# Patient Record
Sex: FEMALE | Race: WHITE | Employment: FULL TIME | RURAL
[De-identification: names, ages, dates, MRNs, and addresses within clinical notes are randomized per-mention and may not be internally consistent; named-entity substitution may affect disease eponyms.]

---

## 2018-08-02 ENCOUNTER — OFFICE VISIT (OUTPATIENT)
Dept: FAMILY MEDICINE CLINIC | Age: 33
End: 2018-08-02

## 2018-08-02 VITALS
HEIGHT: 67 IN | BODY MASS INDEX: 30.61 KG/M2 | DIASTOLIC BLOOD PRESSURE: 75 MMHG | RESPIRATION RATE: 18 BRPM | HEART RATE: 74 BPM | WEIGHT: 195 LBS | SYSTOLIC BLOOD PRESSURE: 130 MMHG | OXYGEN SATURATION: 98 % | TEMPERATURE: 98 F

## 2018-08-02 DIAGNOSIS — G43.909 MIGRAINE WITHOUT STATUS MIGRAINOSUS, NOT INTRACTABLE, UNSPECIFIED MIGRAINE TYPE: ICD-10-CM

## 2018-08-02 DIAGNOSIS — K76.0 FATTY LIVER: ICD-10-CM

## 2018-08-02 DIAGNOSIS — N92.1 MENORRHAGIA WITH IRREGULAR CYCLE: ICD-10-CM

## 2018-08-02 DIAGNOSIS — R10.11 RIGHT UPPER QUADRANT ABDOMINAL PAIN: Primary | ICD-10-CM

## 2018-08-02 LAB
HCG URINE, QL. (POC): NEGATIVE
VALID INTERNAL CONTROL?: YES

## 2018-08-02 RX ORDER — SUMATRIPTAN 25 MG/1
25 TABLET, FILM COATED ORAL
Qty: 12 TAB | Refills: 2 | Status: SHIPPED | OUTPATIENT
Start: 2018-08-02 | End: 2018-08-02

## 2018-08-02 RX ORDER — FAMOTIDINE 40 MG/1
40 TABLET, FILM COATED ORAL
COMMUNITY
Start: 2018-07-30 | End: 2019-10-10 | Stop reason: ALTCHOICE

## 2018-08-02 RX ORDER — NORGESTIMATE AND ETHINYL ESTRADIOL 0.25-0.035
1 KIT ORAL DAILY
Qty: 3 DOSE PACK | Refills: 1 | Status: SHIPPED | OUTPATIENT
Start: 2018-08-02 | End: 2018-09-22

## 2018-08-02 RX ORDER — ONDANSETRON 4 MG/1
4 TABLET, FILM COATED ORAL
COMMUNITY
Start: 2018-07-30 | End: 2018-08-30 | Stop reason: ALTCHOICE

## 2018-08-02 NOTE — PROGRESS NOTES
Identified pt with two pt identifiers(name and ). Chief Complaint   Patient presents with    New Patient     was seen at [facility] on 2018 d/t throwing up blood and abdominal pain - had elevated liver enzymes on labs and ultimetley was dx with fatty liver and stomach ulcer    Heavy Period     was on ernesto, however, then had spotting all the time, was advised ablation but cannot afford at this time        Health Maintenance Due   Topic    PAP AKA CERVICAL CYTOLOGY     Influenza Age 5 to Adult        Wt Readings from Last 3 Encounters:   18 195 lb (88.5 kg)   13 178 lb (80.7 kg)   10/28/13 177 lb 6 oz (80.5 kg)     Temp Readings from Last 3 Encounters:   18 98 °F (36.7 °C) (Oral)   13 98.4 °F (36.9 °C)   10/28/13 97.9 °F (36.6 °C)     BP Readings from Last 3 Encounters:   18 130/75   13 122/74   10/28/13 (!) 148/93     Pulse Readings from Last 3 Encounters:   18 74   13 65   10/28/13 92         Learning Assessment:  :     Learning Assessment 2018   PRIMARY LEARNER Patient   BARRIERS PRIMARY LEARNER NONE   CO-LEARNER CAREGIVER No   PRIMARY LANGUAGE ENGLISH   LEARNER PREFERENCE PRIMARY DEMONSTRATION     LISTENING   ANSWERED BY patient   RELATIONSHIP SELF       Depression Screening:  :     PHQ over the last two weeks 2018   Little interest or pleasure in doing things Not at all   Feeling down, depressed, irritable, or hopeless Not at all   Total Score PHQ 2 0       Fall Risk Assessment:  :     No flowsheet data found. Abuse Screening:  :     Abuse Screening Questionnaire 2018   Do you ever feel afraid of your partner? N   Are you in a relationship with someone who physically or mentally threatens you? N   Is it safe for you to go home?  Y         Coordination of Care Questionnaire:  :     1) Have you been to an emergency room, urgent care clinic since your last visit? no   Hospitalized since your last visit? no             2) Have you seen or consulted any other health care providers outside of [address] [place] [address] since your last visit? no  (Include any pap smears or colon screenings in this section.)    3) Do you have an Advance Directive on file? no  Are you interested in receiving information about Advance Directives? no    Patient is accompanied by self. Reviewed record in preparation for visit and have obtained necessary documentation. Medication reconciliation up to date and corrected with patient at this time.

## 2018-08-02 NOTE — PATIENT INSTRUCTIONS

## 2018-08-02 NOTE — PROGRESS NOTES
HISTORY OF PRESENT ILLNESS  Gia Smith is a 28 y.o. female. HPI  Pt presents as a new patient with \"heavy menses\"    Pt was seen at [facility] on 7/30, for abdominal pain and vomiting up blood. Pt was diagnosed with fatty liver and a stomach ulcer. Pt needs endoscopy for follow up, in relation to the ulcer. Abdominal pain is better with the Pepcid and the zofran has helped the nausea. Pt has yet to make an appointment with specialists. In addition, patient has been followed by [facility] for very heavy menses. Pt had an US, biopsy, and was set up for an ablation, until she found out how expensive this was. Pt declined the ablation due to cost, and was placed on ernesto. Pt states that this regulated her menses for about 4 months, and then she started spotting every day. Due to this, she stopped the Ernesto over a month ago. LMP: 7/10, and it lasted 10 days. Period is incredibly heavy. In addition, patient states that she is followed by neurology for migraines, but she has not been able to follow up due to work schedule. She was on Imitrex, as needed for migraines, and it has worked quite well. Pt is requesting refill, until she is able to be seen by neurology. Review of Systems   Constitutional: Negative for fever. HENT: Negative for congestion. Respiratory: Negative for cough. Gastrointestinal: Positive for abdominal pain and nausea. Negative for blood in stool, constipation, diarrhea and heartburn. Physical Exam   Constitutional: She is oriented to person, place, and time. She appears well-developed and well-nourished. HENT:   Head: Normocephalic and atraumatic. Cardiovascular: Normal rate, regular rhythm and normal heart sounds. Pulmonary/Chest: Effort normal and breath sounds normal. She has no wheezes. She has no rales. She exhibits no tenderness. Abdominal: Soft.  Normal appearance and bowel sounds are normal. There is tenderness in the right upper quadrant. There is no rigidity, no rebound, no guarding and no CVA tenderness. Neurological: She is alert and oriented to person, place, and time. Skin: Skin is warm and dry. Psychiatric: She has a normal mood and affect. Her behavior is normal.       ASSESSMENT and PLAN    ICD-10-CM ICD-9-CM    1. Right upper quadrant abdominal pain R10.11 789.01 REFERRAL TO GASTROENTEROLOGY   2. Fatty liver K76.0 571.8 REFERRAL TO LIVER HEPATOLOGY   3. Migraine without status migrainosus, not intractable, unspecified migraine type G43.909 346.90 SUMAtriptan (IMITREX) 25 mg tablet   4. Menorrhagia with irregular cycle N92.1 626.2 AMB POC URINE PREGNANCY TEST, VISUAL COLOR COMPARISON      norgestimate-ethinyl estradiol (ORTHO-CYCLEN, SPRINTEC) 0.25-35 mg-mcg tab     Informed patient to call GI and hepatology today, for appointment ASAP. Educated about always going to ER with worsening of abdominal pain, vomiting, fever, etc.    Will try ortho cyclen, for menses control. Educated about following up with neurology, as previously scheduled. Pt informed to return to office with worsening of symptoms, or PRN with any questions or concerns. Pt verbalizes understanding of plan of care and denies further questions or concerns at this time.

## 2018-08-02 NOTE — MR AVS SNAPSHOT
[facility] [address] [facility] 
[phone] Patient: Clemencia Hilliard MRN:  :[id number] Visit Information Date & Time Provider Department Dept. Phone Encounter #  
 2018  1:30 PM Dario Barrow Sathish [id number] Follow-up Instructions Return if symptoms worsen or fail to improve. Upcoming Health Maintenance Date Due  
 PAP AKA CERVICAL CYTOLOGY 2005 Influenza Age 5 to Adult 2018 DTaP/Tdap/Td series (2 - Td) 2023 Allergies as of 2018  Review Complete On: 2018 By: Dario Barrow NP No Known Allergies Current Immunizations  Reviewed on 10/28/2013 Name Date Tdap 2013  9:56 AM  
  
 Not reviewed this visit You Were Diagnosed With   
  
 Codes Comments Right upper quadrant abdominal pain    -  Primary ICD-10-CM: R10.11 ICD-9-CM: 789.01 Fatty liver     ICD-10-CM: K76.0 ICD-9-CM: 571.8 Migraine without status migrainosus, not intractable, unspecified migraine type     ICD-10-CM: G43.909 ICD-9-CM: 346.90 Menorrhagia with irregular cycle     ICD-10-CM: N92.1 ICD-9-CM: 626.2 Vitals BP Pulse Temp Resp Height(growth percentile) Weight(growth percentile) 130/75 (BP 1 Location: Right arm, BP Patient Position: Sitting) 74 98 °F (36.7 °C) (Oral) 18 5' 7\" (1.702 m) 195 lb (88.5 kg) LMP SpO2 Breastfeeding? BMI OB Status Smoking Status 07/10/2018 (Approximate) 98% No 30.54 kg/m2 Having regular periods Never Smoker BMI and BSA Data Body Mass Index Body Surface Area 30.54 kg/m 2 2.05 m 2 Preferred Pharmacy Pharmacy Name Phone [phone] Your Updated Medication List  
  
   
This list is accurate as of 18  2:06 PM.  Always use your most recent med list.  
  
  
  
  
 famotidine 40 mg tablet Commonly known as:  PEPCID Take 40 mg by mouth nightly. norgestimate-ethinyl estradiol 0.25-35 mg-mcg Tab Commonly known as:  Sameul Rickrosario Take 1 Tab by mouth daily. Must take at same time every day. ondansetron hcl 4 mg tablet Commonly known as:  Chelsea Scriver Take 4 mg by mouth every six (6) hours as needed. SUMAtriptan 25 mg tablet Commonly known as:  IMITREX Take 1 Tab by mouth once as needed for Migraine for up to 1 dose. May repeat dose x 1 after 2 hours. Prescriptions Sent to Pharmacy Refills SUMAtriptan (IMITREX) 25 mg tablet 2 Sig: Take 1 Tab by mouth once as needed for Migraine for up to 1 dose. May repeat dose x 1 after 2 hours. Class: Normal  
 Pharmacy: [address] [place] [address] Ph #: [phone] Route: Oral  
 norgestimate-ethinyl estradiol (ORTHO-CYCLEN, SPRINTEC) 0.25-35 mg-mcg tab 1 Sig: Take 1 Tab by mouth daily. Must take at same time every day. Class: Normal  
 Pharmacy: [address] [place] [address] Ph #: [phone] Route: Oral  
  
We Performed the Following AMB POC URINE PREGNANCY TEST, VISUAL COLOR COMPARISON [23028 CPT(R)] REFERRAL TO GASTROENTEROLOGY [TAV55 Custom] REFERRAL TO LIVER HEPATOLOGY [IAB367 Custom] Follow-up Instructions Return if symptoms worsen or fail to improve. Referral Information Referral ID Referred By Referred To  
  
 [id number] [place] [address] [address] [place] [address], [id number] [facility] Visits Status Start Date End Date 1 New Request 8/2/18 8/2/19 If your referral has a status of pending review or denied, additional information will be sent to support the outcome of this decision. Referral ID Referred By Referred To  
 [id number] Debbie PARTIDA MD  
   [address] [facility] [address] [place] [address] Phone: [phone] Fax: [phone] Visits Status Start Date End Date 1 New Request 8/2/18 8/2/19 If your referral has a status of pending review or denied, additional information will be sent to support the outcome of this decision. Patient Instructions Abdominal Pain: Care Instructions Your Care Instructions Abdominal pain has many possible causes. Some aren't serious and get better on their own in a few days. Others need more testing and treatment. If your pain continues or gets worse, you need to be rechecked and may need more tests to find out what is wrong. You may need surgery to correct the problem. Don't ignore new symptoms, such as fever, nausea and vomiting, urination problems, pain that gets worse, and dizziness. These may be signs of a more serious problem. Your doctor may have recommended a follow-up visit in the next 8 to 12 hours. If you are not getting better, you may need more tests or treatment. The doctor has checked you carefully, but problems can develop later. If you notice any problems or new symptoms, get medical treatment right away. Follow-up care is a key part of your treatment and safety. Be sure to make and go to all appointments, and call your doctor if you are having problems. It's also a good idea to know your test results and keep a list of the medicines you take. How can you care for yourself at home? · Rest until you feel better. · To prevent dehydration, drink plenty of fluids, enough so that your urine is light yellow or clear like water. Choose water and other caffeine-free clear liquids until you feel better. If you have kidney, heart, or liver disease and have to limit fluids, talk with your doctor before you increase the amount of fluids you drink. · If your stomach is upset, eat mild foods, such as rice, dry toast or crackers, bananas, and applesauce. Try eating several small meals instead of two or three large ones. · Wait until 48 hours after all symptoms have gone away before you have spicy foods, alcohol, and drinks that contain caffeine. · Do not eat foods that are high in fat. · Avoid anti-inflammatory medicines such as aspirin, ibuprofen (Advil, Motrin), and naproxen (Aleve). These can cause stomach upset. Talk to your doctor if you take daily aspirin for another health problem. When should you call for help? Call 911 anytime you think you may need emergency care. For example, call if: 
  · You passed out (lost consciousness).  
  · You pass maroon or very bloody stools.  
  · You vomit blood or what looks like coffee grounds.  
  · You have new, severe belly pain.  
 Call your doctor now or seek immediate medical care if: 
  · Your pain gets worse, especially if it becomes focused in one area of your belly.  
  · You have a new or higher fever.  
  · Your stools are black and look like tar, or they have streaks of blood.  
  · You have unexpected vaginal bleeding.  
  · You have symptoms of a urinary tract infection. These may include: 
¨ Pain when you urinate. ¨ Urinating more often than usual. 
¨ Blood in your urine.  
  · You are dizzy or lightheaded, or you feel like you may faint.  
 Watch closely for changes in your health, and be sure to contact your doctor if: 
  · You are not getting better after 1 day (24 hours). Where can you learn more? Go to http://wes-mohamud.info/. Enter I640 in the search box to learn more about \"Abdominal Pain: Care Instructions. \" Current as of: November 20, 2017 Content Version: 11.7 © 0685-3002 "[employer] Inc.". Care instructions adapted under license by [employer] (which disclaims liability or warranty for this information). If you have questions about a medical condition or this instruction, always ask your healthcare professional. [address] any warranty or liability for your use of this information. Introducing [facility] & HEALTH SERVICES! Dear Frank Randall: Thank you for requesting a Mostro account. Our records indicate that you already have an active Mostro account. You can access your account anytime at [email or web address]. [employer] Did you know that you can access your hospital and ER discharge instructions at any time in Mostro? You can also review all of your test results from your hospital stay or ER visit. Additional Information If you have questions, please visit the Frequently Asked Questions section of the Mostro website at [email or web address]/. Remember, Mostro is NOT to be used for urgent needs. For medical emergencies, dial 911. Now available from your iPhone and Android! Please provide this summary of care documentation to your next provider. Your primary care clinician is listed as Jocelynn Freire. If you have any questions after today's visit, please call [phone].

## 2018-08-30 ENCOUNTER — OFFICE VISIT (OUTPATIENT)
Dept: FAMILY MEDICINE CLINIC | Age: 33
End: 2018-08-30

## 2018-08-30 VITALS
RESPIRATION RATE: 16 BRPM | BODY MASS INDEX: 31.08 KG/M2 | SYSTOLIC BLOOD PRESSURE: 130 MMHG | OXYGEN SATURATION: 97 % | HEIGHT: 67 IN | WEIGHT: 198 LBS | DIASTOLIC BLOOD PRESSURE: 83 MMHG | HEART RATE: 95 BPM | TEMPERATURE: 98.4 F
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
## 2018-08-30 DIAGNOSIS — L03.115 CELLULITIS OF RIGHT LOWER EXTREMITY: Primary | ICD-10-CM

## 2018-08-30 RX ORDER — DIPHENHYDRAMINE HCL 25 MG
25 CAPSULE ORAL
COMMUNITY
End: 2018-09-22

## 2018-08-30 RX ORDER — PREDNISONE 5 MG/1
TABLET ORAL
Qty: 21 TAB | Refills: 0 | Status: SHIPPED | OUTPATIENT
Start: 2018-08-30 | End: 2018-09-22

## 2018-08-30 RX ORDER — CEPHALEXIN 500 MG/1
500 CAPSULE ORAL 2 TIMES DAILY
Qty: 20 CAP | Refills: 0 | Status: SHIPPED | OUTPATIENT
Start: 2018-08-30 | End: 2018-09-09

## 2018-08-30 RX ORDER — SUMATRIPTAN 25 MG/1
25 TABLET, FILM COATED ORAL AS NEEDED
COMMUNITY
End: 2020-07-15

## 2018-08-30 NOTE — LETTER
NOTIFICATION RETURN TO WORK / SCHOOL 
 
8/30/2018 10:21 AM 
 
Ms. Stephen Arias [id number] [facility]  [address] To Whom It May Concern: 
 
Stephen Arias is currently under the care of [address] [place] [address]. She needs to be excused from work on  8/30, due to illness. If there are questions or concerns please have the patient contact our office. Sincerely, Apoorva Vasques NP

## 2018-08-30 NOTE — MR AVS SNAPSHOT
[address] [facility] 
[phone] Patient: Clemencia Hilliard MRN:  :[id number] Visit Information Date & Time Provider Department Dept. Phone Encounter #  
 2018  9:45 AM Dario Barrow Sathish [phone] [id number] Follow-up Instructions Return if symptoms worsen or fail to improve. Your Appointments 2018  9:00 AM  
New Patient with Marisela Alcala MD  
[address] ([facility]) Appt Note: New Patient referred  NP Ko for elevated enzymes and fatty liver tr 8/3/18 [address] [place] [address] [phone] [facility] [address]  
[address] [facility] [place] [address] Upcoming Health Maintenance Date Due  
 PAP AKA CERVICAL CYTOLOGY 2005 Influenza Age 5 to Adult 2018 DTaP/Tdap/Td series (2 - Td) 2023 Allergies as of 2018  Review Complete On: 2018 By: Dario Barrow NP No Known Allergies Current Immunizations  Reviewed on 10/28/2013 Name Date Tdap 2013  9:56 AM  
  
 Not reviewed this visit You Were Diagnosed With   
  
 Codes Comments Cellulitis of right lower extremity    -  Primary ICD-10-CM: E71.471 ICD-9-CM: 682.6 Insect bite, initial encounter     ICD-10-CM: W57. Jimmye Rise ICD-9-CM: 919.4, E906.4 Vitals BP Pulse Temp Resp Height(growth percentile) Weight(growth percentile) 130/83 (BP 1 Location: Left arm, BP Patient Position: Sitting) 95 98.4 °F (36.9 °C) (Oral) 16 5' 7\" (1.702 m) 198 lb (89.8 kg) SpO2 BMI OB Status Smoking Status 97% 31.01 kg/m2 Having regular periods Never Smoker Vitals History BMI and BSA Data Body Mass Index Body Surface Area 31.01 kg/m 2 2.06 m 2 Preferred Pharmacy Pharmacy Name Phone [id number] [phone] Your Updated Medication List  
  
   
This list is accurate as of 8/30/18 10:21 AM.  Always use your most recent med list.  
  
  
  
  
 BENADRYL 25 mg capsule Generic drug:  diphenhydrAMINE Take 25 mg by mouth every six (6) hours as needed. cephALEXin 500 mg capsule Commonly known as:  Eddi Mukesh Take 1 Cap by mouth two (2) times a day for 10 days. famotidine 40 mg tablet Commonly known as:  PEPCID Take 40 mg by mouth nightly. norgestimate-ethinyl estradiol 0.25-35 mg-mcg Tab Commonly known as:  Denver Lindau Take 1 Tab by mouth daily. Must take at same time every day. predniSONE 5 mg dose pack Commonly known as:  STERAPRED See administration instruction per 5mg dose pack SUMAtriptan 25 mg tablet Commonly known as:  IMITREX Take 25 mg by mouth as needed. Prescriptions Sent to Pharmacy Refills  
 predniSONE (STERAPRED) 5 mg dose pack 0 Sig: See administration instruction per 5mg dose pack Class: Normal  
 Pharmacy: [address] [place] [address] Ph #: [phone]  
 cephALEXin (KEFLEX) 500 mg capsule 0 Sig: Take 1 Cap by mouth two (2) times a day for 10 days. Class: Normal  
 Pharmacy: [facility] [address], [address] [place] [address] Ph #: [phone] Route: Oral  
  
Follow-up Instructions Return if symptoms worsen or fail to improve. Patient Instructions Cellulitis: Care Instructions Your Care Instructions Cellulitis is a skin infection caused by bacteria, most often strep or staph. It often occurs after a break in the skin from a scrape, cut, bite, or puncture, or after a rash. Cellulitis may be treated without doing tests to find out what caused it. But your doctor may do tests, if needed, to look for a specific bacteria, like methicillin-resistant Staphylococcus aureus (MRSA). The doctor has checked you carefully, but problems can develop later.  If you notice any problems or new symptoms, get medical treatment right away. Follow-up care is a key part of your treatment and safety. Be sure to make and go to all appointments, and call your doctor if you are having problems. It's also a good idea to know your test results and keep a list of the medicines you take. How can you care for yourself at home? · Take your antibiotics as directed. Do not stop taking them just because you feel better. You need to take the full course of antibiotics. · Prop up the infected area on pillows to reduce pain and swelling. Try to keep the area above the level of your heart as often as you can. · If your doctor told you how to care for your wound, follow your doctor's instructions. If you did not get instructions, follow this general advice: ¨ Wash the wound with clean water 2 times a day. Don't use hydrogen peroxide or alcohol, which can slow healing. ¨ You may cover the wound with a thin layer of petroleum jelly, such as Vaseline, and a nonstick bandage. ¨ Apply more petroleum jelly and replace the bandage as needed. · Be safe with medicines. Take pain medicines exactly as directed. ¨ If the doctor gave you a prescription medicine for pain, take it as prescribed. ¨ If you are not taking a prescription pain medicine, ask your doctor if you can take an over-the-counter medicine. To prevent cellulitis in the future · Try to prevent cuts, scrapes, or other injuries to your skin. Cellulitis most often occurs where there is a break in the skin. · If you get a scrape, cut, mild burn, or bite, wash the wound with clean water as soon as you can to help avoid infection. Don't use hydrogen peroxide or alcohol, which can slow healing. · If you have swelling in your legs (edema), support stockings and good skin care may help prevent leg sores and cellulitis.  
· Take care of your feet, especially if you have diabetes or other conditions that increase the risk of infection. Wear shoes and socks. Do not go barefoot. If you have athlete's foot or other skin problems on your feet, talk to your doctor about how to treat them. When should you call for help? Call your doctor now or seek immediate medical care if: 
  · You have signs that your infection is getting worse, such as: 
¨ Increased pain, swelling, warmth, or redness. ¨ Red streaks leading from the area. ¨ Pus draining from the area. ¨ A fever.  
  · You get a rash.  
 Watch closely for changes in your health, and be sure to contact your doctor if: 
  · You do not get better as expected. Where can you learn more? Go to http://wes-mohamud.info/. [place] in the search box to learn more about \"Cellulitis: Care Instructions. \" Current as of: May 10, 2017 Content Version: 11.7 © 2970-1218 "Centerbeam, Inc.". Care instructions adapted under license by [employer] (which disclaims liability or warranty for this information). If you have questions about a medical condition or this instruction, always ask your healthcare professional. [place] [address] any warranty or liability for your use of this information. Introducing [facility] & HEALTH SERVICES! Dear Yari Foreman: Thank you for requesting a Enliken account. Our records indicate that you already have an active Enliken account. You can access your account anytime at [email or web address] Did you know that you can access your hospital and ER discharge instructions at any time in Enliken? You can also review all of your test results from your hospital stay or ER visit. Additional Information If you have questions, please visit the Frequently Asked Questions section of the Enliken website at [email or web address] Remember, Enliken is NOT to be used for urgent needs. For medical emergencies, dial 911. Now available from your iPhone and Android! Please provide this summary of care documentation to your next provider. Your primary care clinician is listed as Jocelynn Freire. If you have any questions after today's visit, please call [phone].

## 2018-08-30 NOTE — PROGRESS NOTES
Identified pt with two pt identifiers(name and ). Chief Complaint   Patient presents with   A[address] 82     was bitten by some type of insect last week while cutting grass and area is inflamed, itchy, swollen and painful - OTC medication has not been effective        Health Maintenance Due   Topic    PAP AKA CERVICAL CYTOLOGY     Influenza Age 5 to Adult        Wt Readings from Last 3 Encounters:   18 198 lb (89.8 kg)   18 195 lb (88.5 kg)   13 178 lb (80.7 kg)     Temp Readings from Last 3 Encounters:   18 98.4 °F (36.9 °C) (Oral)   18 98 °F (36.7 °C) (Oral)   13 98.4 °F (36.9 °C)     BP Readings from Last 3 Encounters:   18 130/83   18 130/75   13 122/74     Pulse Readings from Last 3 Encounters:   18 95   18 74   13 65         Learning Assessment:  :     Learning Assessment 2018   PRIMARY LEARNER Patient   BARRIERS PRIMARY LEARNER NONE   CO-LEARNER CAREGIVER No   PRIMARY LANGUAGE ENGLISH   LEARNER PREFERENCE PRIMARY DEMONSTRATION     LISTENING   ANSWERED BY patient   RELATIONSHIP SELF       Depression Screening:  :     PHQ over the last two weeks 2018   Little interest or pleasure in doing things Not at all   Feeling down, depressed, irritable, or hopeless Not at all   Total Score PHQ 2 0       Fall Risk Assessment:  :     No flowsheet data found. Abuse Screening:  :     Abuse Screening Questionnaire 2018   Do you ever feel afraid of your partner? N   Are you in a relationship with someone who physically or mentally threatens you? N   Is it safe for you to go home? Y       Coordination of Care Questionnaire:  :     1) Have you been to an emergency room, urgent care clinic since your last visit?  Yes, has been seen at [facility] for throwing up blood, was then sent to GI specialists   Hospitalized since your last visit? no             2) Have you seen or consulted any other health care providers outside of [facility] since your last visit? yes, GI specialists  (Include any pap smears or colon screenings in this section.)    3) Do you have an Advance Directive on file? no  Are you interested in receiving information about Advance Directives? no    Patient is accompanied by self. Reviewed record in preparation for visit and have obtained necessary documentation. Medication reconciliation up to date and corrected with patient at this time.

## 2018-08-30 NOTE — PATIENT INSTRUCTIONS
Cellulitis: Care Instructions  Your Care Instructions    Cellulitis is a skin infection caused by bacteria, most often strep or staph. It often occurs after a break in the skin from a scrape, cut, bite, or puncture, or after a rash. Cellulitis may be treated without doing tests to find out what caused it. But your doctor may do tests, if needed, to look for a specific bacteria, like methicillin-resistant Staphylococcus aureus (MRSA). The doctor has checked you carefully, but problems can develop later. If you notice any problems or new symptoms, get medical treatment right away. Follow-up care is a key part of your treatment and safety. Be sure to make and go to all appointments, and call your doctor if you are having problems. It's also a good idea to know your test results and keep a list of the medicines you take. How can you care for yourself at home? · Take your antibiotics as directed. Do not stop taking them just because you feel better. You need to take the full course of antibiotics. · Prop up the infected area on pillows to reduce pain and swelling. Try to keep the area above the level of your heart as often as you can. · If your doctor told you how to care for your wound, follow your doctor's instructions. If you did not get instructions, follow this general advice:  ¨ Wash the wound with clean water 2 times a day. Don't use hydrogen peroxide or alcohol, which can slow healing. ¨ You may cover the wound with a thin layer of petroleum jelly, such as Vaseline, and a nonstick bandage. ¨ Apply more petroleum jelly and replace the bandage as needed. · Be safe with medicines. Take pain medicines exactly as directed. ¨ If the doctor gave you a prescription medicine for pain, take it as prescribed. ¨ If you are not taking a prescription pain medicine, ask your doctor if you can take an over-the-counter medicine.   To prevent cellulitis in the future  · Try to prevent cuts, scrapes, or other injuries to your skin. Cellulitis most often occurs where there is a break in the skin. · If you get a scrape, cut, mild burn, or bite, wash the wound with clean water as soon as you can to help avoid infection. Don't use hydrogen peroxide or alcohol, which can slow healing. · If you have swelling in your legs (edema), support stockings and good skin care may help prevent leg sores and cellulitis. · Take care of your feet, especially if you have diabetes or other conditions that increase the risk of infection. Wear shoes and socks. Do not go barefoot. If you have athlete's foot or other skin problems on your feet, talk to your doctor about how to treat them. When should you call for help? Call your doctor now or seek immediate medical care if:    · You have signs that your infection is getting worse, such as:  ¨ Increased pain, swelling, warmth, or redness. ¨ Red streaks leading from the area. ¨ Pus draining from the area. ¨ A fever.     · You get a rash.    Watch closely for changes in your health, and be sure to contact your doctor if:    · You do not get better as expected. Where can you learn more? Go to http://wes-mohamud.info/. Royal Davila in the search box to learn more about \"Cellulitis: Care Instructions. \"  Current as of: May 10, 2017  Content Version: 11.7  © 7713-8686 Healthwise, Incorporated. Care instructions adapted under license by its learning (which disclaims liability or warranty for this information). If you have questions about a medical condition or this instruction, always ask your healthcare professional. [place] [address] any warranty or liability for your use of this information.

## 2018-09-21 ENCOUNTER — OFFICE VISIT (OUTPATIENT)
Dept: HEMATOLOGY | Age: 33
End: 2018-09-21

## 2018-09-21 VITALS
DIASTOLIC BLOOD PRESSURE: 96 MMHG | HEART RATE: 90 BPM | HEIGHT: 67 IN | SYSTOLIC BLOOD PRESSURE: 141 MMHG | BODY MASS INDEX: 31.04 KG/M2 | TEMPERATURE: 99.5 F | WEIGHT: 197.8 LBS | OXYGEN SATURATION: 98 %

## 2018-09-21 DIAGNOSIS — R74.8 ELEVATED LIVER ENZYMES: Primary | ICD-10-CM

## 2018-09-21 PROBLEM — G62.9 NEUROPATHY: Status: ACTIVE | Noted: 2018-09-21

## 2018-09-21 PROBLEM — K76.0 FATTY LIVER: Status: ACTIVE | Noted: 2018-09-21

## 2018-09-21 PROBLEM — K20.0 EOSINOPHILIC ESOPHAGITIS: Status: ACTIVE | Noted: 2018-09-21

## 2018-09-21 RX ORDER — ACETAMINOPHEN 325 MG/1
TABLET ORAL
COMMUNITY
End: 2020-07-15

## 2018-09-21 NOTE — PROGRESS NOTES
Sarah Desai MD, [address] [facility], [facility], [place]       TYRELL Manzanares PA-C Louetta Peace, [facility]   TYRELL Dangelo NP [address]    at [address], [address]    [phone]    FAX: [email or web address]    at [address], [address]    [phone]    FAX: [phone]       Patient Care Team:  Gemma Mirza NP as PCP - General (Nurse Practitioner)  Anila Dixon RN as Nurse Navigator (Family Practice)      Problem List  Date Reviewed: 9/21/2018          Codes Class Noted    Eosinophilic esophagitis ZXS-65-OL: K20.0  ICD-9-CM: 530.13  9/21/2018        NAFL (nonalcoholic fatty liver) DNL-05-AH: K76.0  ICD-9-CM: 571.8  9/21/2018        Neuropathy ICD-10-CM: G62.9  ICD-9-CM: 355.9  9/21/2018                Emmanuel Marie returns to the [address] for management of non-alcoholic fatty liver (NAFL). The active problem list, all pertinent past medical history, medications, radiologic findings and laboratory findings related to the liver disorder were reviewed with the patient. The patient is a 28 y.o.  female who is suspected to have fatty liver disease based upon ultrasound. Serologic evaluation for markers of chronic liver disease were negative     Ultrasound of the liver was performed in 8/2018. The results of the imaging are not available at this time. The patient believes this suggested fatty liver disease. Assessment of liver fibrosis was performed in the office today with Fibroscan. The result was 6.1 kPa which correlates with no fibrosis.     The patient notes fatigue, pain in the right side over the liver, nausea,     The patient has not experienced vomiting, constipation, diarrhea,     The patient has Mild limitations in functional activities which can be attributed to the liver disease and to other medical problems that are not related to the liver disease. All of the issues listed in the Assessment and Plan were discussed with the patient. All questions were answered. The patient expressed a clear understanding of the above. [facility] in 6 months to monitor the imapct of diet and weight loss. ASSESSMENT AND PLAN:  NAFL/benign steatosis  The diagnosis is based upon Fiboscan CAP score, and imaging  Elastography performed in 9/2018 suggests no fibrosis    If the patient looses 20% of current body weight, which is down to a weight of of 159 pounds, all steatosis will have resolved. Serologic testing for causes of chronic liver disease were ordered. Suspect the patient has fatty liver based upon ultrasound, and Fibroscan  CAP. Only a liver biopsy can confirm is the patient does have fatty liver and differentiate benign steatosis from RED. The treatment is the same; weight reduction. If the liver biopsy demonstrates RED the patient would be eligible for enrollment into clinical trials for treatment of RED. The need to perform a liver biopsy to help determine the cause and severity of the liver test abnormalities was discussed. The risks of performing the liver biopsy including pain, puncture of the lung, gallbladder, intestine or kidney and bleeding were discussed. There is no reason to perform liver biopsy at this time because of the low liver stiffness suggesting no fibrosis. The patient was counseled regarding diet and exercise to achieve weight loss.   The best diet for patients with fatty liver is one very low in carbohydrates and enriched with protein such as an [employer] program.    The patient was told to consume any food products and drinks containing fructose as this enhances hepatic fat synthesis. There is no medication or vitamin supplements that we advocate for RED. Using glitazones in patients without diabetes mellitus has been shown to reduce fat content in the liver but has no effect on fibrosis and is associated with weight gain. Vitamin E has also been used but the data is not very good and most experts no longer advocate this. We will continue to monitor the patient at periodic intervals. If weight loss is successful the fat will resolve from the liver and liver enzymes should return to the normal range. We would then repeat an ultrasound to see if this also returns to normal.  If liver enzymes do not return to normal with weight reduction additional evaluation may be necessary over time. Treatment of other medical problems in patients with chronic liver disease  There are no contraindications for the patient to take any medications that are necessary for treatment of other medical issues. The patient does not consume alcohol daily. Normal doses of acetaminophen can be used for pain as needed. Normal doses of acetaminophen as recommended on the label are not hepatotoxic, even in patients with cirrhosis. The patient does not have cirrhosis. Normal doses of NSAIDs can be used for pain as needed. Counseling for alcohol in patients with chronic liver disease  The patient was counseled regarding alcohol consumption and the effect of alcohol on chronic liver disease. The patient does not consume any significant amount of alcohol. Vaccinations   The need for vaccination against viral hepatitis A and B will be assessed with serologic and instituted as appropriate. Routine vaccinations against other bacterial and viral agents can be performed as indicated. Annual flu vaccination should be administered if indicated.       ALLERGIES  No Known Allergies    MEDICATIONS  Current Outpatient Prescriptions   Medication Sig    acetaminophen (TYLENOL) 325 mg tablet Take  by mouth every four (4) hours as needed for Pain.  SUMAtriptan (IMITREX) 25 mg tablet Take 25 mg by mouth as needed.  famotidine (PEPCID) 40 mg tablet Take 40 mg by mouth nightly. No current facility-administered medications for this visit. SYSTEM REVIEW NOT RELATED TO LIVER DISEASE OR REVIEWED ABOVE:  Constitution systems: Negative for fever, chills, weight gain, weight loss. Eyes: Negative for visual changes. ENT: Negative for sore throat, painful swallowing. Respiratory: Negative for cough, hemoptysis, SOB. Cardiology: Negative for chest pain, palpitations. GI:  Negative for constipation or diarrhea. : Negative for urinary frequency, dysuria, hematuria, nocturia. Skin: Negative for rash. Hematology: Negative for easy bruising, blood clots. Musculo-skelatal: Negative for back pain, muscle pain, weakness. Neurologic: Negative for headaches, dizziness, vertigo, memory problems not related to HE. Psychology: Negative for anxiety, depression. FAMILY HISTORY:  The patient has no knowledge of the father's medical condition. The mother is Has the following chronic diseases: alcoholism. There is no family history of liver disease. SOCIAL HISTORY:  The patient is . The patient has 3 children,   The patient has never used tobacco products. The patient has never consumed significant amounts of alcohol. 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 PHYSICAL EXAMINATION:  VS: per nursing note  General: No acute distress. Eyes: Sclera anicteric. ENT: No oral lesions. Thyroid normal.  Nodes: No adenopathy. Skin: No spider angiomata. No jaundice. No palmar erythema. Respiratory: Lungs clear to auscultation. Cardiovascular: Regular heart rate. No murmurs. No JVD. Abdomen: Soft non-tender, liver size normal to percussion/palpation. Spleen not palpable. No obvious ascites. Extremities: No edema.   No muscle wasting. No gross arthritic changes. Neurologic: Alert and oriented. Cranial nerves grossly intact. No asterixis. LABORATORY STUDIES:  From 8/2018  AST/ALT/ALP/T Bili/ALB:  46/61/87/1.2/4.6  WBC/HB/PLT/INR:  6.1/14.7/362    SEROLOGIES:  8/2018. HAV total positive, HBsAntigen negative, anti-HBsurface positive, anti-HCV negative, Ferritin 64, iron saturation 28%, ASMA negative, ceruloplsmin 32,     LIVER HISTOLOGY:  9/2018. FibroScan performed at [address]. EkPa was 6.1. IQR/med 3%. . The results suggested a fibrosis level of F0-1.   The CAP score suggests fatty liver    ENDOSCOPIC PROCEDURES:  Not available or performed    RADIOLOGY:  Not available or performed    OTHER TESTING:  Not available or performed      Virgilio Dover MD  Liver [place] [address] of [address] [address] [facility] [address] [address] [address] Angel Hermosillo [address] [place], [place] [address]. [phone]

## 2018-09-21 NOTE — MR AVS SNAPSHOT
[address] [facility] [phone] Shae [address] 
[phone] Patient: Charles Richey MRN:  [id number] Visit Information Date & Time Provider Department Dept. Phone Encounter #  
 2018  9:00 AM Jannet Devinejacqueline Dejesus of [facility] 219 [id number] Follow-up Instructions Return in about 6 months (around 3/21/2019) for NP. Upcoming Health Maintenance Date Due  
 PAP AKA CERVICAL CYTOLOGY 2005 Influenza Age 5 to Adult 2018 DTaP/Tdap/Td series (2 - Td) 2023 Allergies as of 2018  Review Complete On: 2018 By: Kaitlin Reeves LPN No Known Allergies Current Immunizations  Reviewed on 10/28/2013 Name Date Tdap 2013  9:56 AM  
  
 Not reviewed this visit Vitals BP Pulse Temp Height(growth percentile) Weight(growth percentile) (!) 141/96 (BP 1 Location: Left arm, BP Patient Position: Sitting) 90 99.5 °F (37.5 °C) (Tympanic) 5' 7\" (1.702 m) 197 lb 12.8 oz (89.7 kg) SpO2 BMI OB Status Smoking Status 98% 30.98 kg/m2 Having regular periods Never Smoker BMI and BSA Data Body Mass Index Body Surface Area 30.98 kg/m 2 2.06 m 2 Preferred Pharmacy Pharmacy Name Phone [id number] [place] [phone] Your Updated Medication List  
  
   
This list is accurate as of 18 10:42 AM.  Always use your most recent med list.  
  
  
  
  
 BENADRYL 25 mg capsule Generic drug:  diphenhydrAMINE Take 25 mg by mouth every six (6) hours as needed. famotidine 40 mg tablet Commonly known as:  PEPCID Take 40 mg by mouth nightly. norgestimate-ethinyl estradiol 0.25-35 mg-mcg Tab Commonly known as:  Chelo Krausong Take 1 Tab by mouth daily. Must take at same time every day. predniSONE 5 mg dose pack Commonly known as:  STERAPRED  
 See administration instruction per 5mg dose pack SUMAtriptan 25 mg tablet Commonly known as:  IMITREX Take 25 mg by mouth as needed. TYLENOL 325 mg tablet Generic drug:  acetaminophen Take  by mouth every four (4) hours as needed for Pain. Follow-up Instructions Return in about 6 months (around 3/21/2019) for NP. Introducing [facility] SERVICES! Dear Nayla Randle: Thank you for requesting a Appnique account. Our records indicate that you already have an active Appnique account. You can access your account anytime at [email or web address] Did you know that you can access your hospital and ER discharge instructions at any time in Appnique? You can also review all of your test results from your hospital stay or ER visit. Additional Information If you have questions, please visit the Frequently Asked Questions section of the Appnique website at [email or web address] Remember, Appnique is NOT to be used for urgent needs. For medical emergencies, dial 911. Now available from your iPhone and Android! Please provide this summary of care documentation to your next provider. Your primary care clinician is listed as Jocelynn Freire. If you have any questions after today's visit, please call [phone].

## 2018-09-21 NOTE — PROGRESS NOTES
Chief Complaint   Patient presents with   [address] [facility] Patient     Elevated Liver Enzymes      Visit Vitals    BP (!) 141/96 (BP 1 Location: Left arm, BP Patient Position: Sitting)    Pulse 90    Temp 99.5 °F (37.5 °C) (Tympanic)    Ht 5' 7\" (1.702 m)    Wt 197 lb 12.8 oz (89.7 kg)    SpO2 98%    BMI 30.98 kg/m2     PHQ over the last two weeks 9/21/2018   Little interest or pleasure in doing things Several days   Feeling down, depressed, irritable, or hopeless Several days   Total Score PHQ 2 2

## 2019-05-03 ENCOUNTER — OFFICE VISIT (OUTPATIENT)
Dept: HEMATOLOGY | Age: 34
End: 2019-05-03

## 2019-05-03 VITALS
WEIGHT: 176.6 LBS | SYSTOLIC BLOOD PRESSURE: 113 MMHG | DIASTOLIC BLOOD PRESSURE: 65 MMHG | TEMPERATURE: 99.5 F | OXYGEN SATURATION: 99 % | HEIGHT: 67 IN | HEART RATE: 71 BPM | BODY MASS INDEX: 27.72 KG/M2

## 2019-05-03 DIAGNOSIS — R74.8 ELEVATED LIVER ENZYMES: Primary | ICD-10-CM

## 2019-05-03 RX ORDER — TRAZODONE HYDROCHLORIDE 50 MG/1
50 TABLET ORAL
Qty: 30 TAB | Refills: 3 | Status: SHIPPED | OUTPATIENT
Start: 2019-05-03 | End: 2020-07-15

## 2019-05-03 NOTE — PROGRESS NOTES
Sarah Desai MD, [address], [place], [place]       TYRELL Prado, ODILON Ramírez, [facility]   TYRELL Gillespie NP [address]    at [address], [address] [place] [address], [facility] 22.    [phone]    FAX: [email or web address]    at [address], [address]    [phone]    FAX: [phone]       Patient Care Team:  Rohith Dunn NP as PCP - General (Nurse Practitioner)  Jeny Cross RN as Nurse Navigator (Family Practice)      Problem List  Date Reviewed: 9/22/2018          Codes Class Noted    Eosinophilic esophagitis NPV-51-WL: K20.0  ICD-9-CM: 530.13  9/21/2018        NAFL (nonalcoholic fatty liver) CSA-09-WV: K76.0  ICD-9-CM: 571.8  9/21/2018        Neuropathy ICD-10-CM: G62.9  ICD-9-CM: 355.9  9/21/2018              Oj Arroyo returns to the [address] for management of non-alcoholic fatty liver (NAFL). The active problem list, all pertinent past medical history, medications, radiologic findings and laboratory findings related to the liver disorder were reviewed with the patient. The patient is a 28 y.o.  female who is suspected to have fatty liver disease based upon ultrasound. Serologic evaluation for markers of chronic liver disease were negative. Ultrasound of the liver was performed in 8/2018. The results of the imaging are not available at this time. The patient believes this suggested fatty liver disease. Assessment of liver fibrosis was performed in the office 9/2018 with Fibroscan. The result was 6.1 kPa which correlates with no fibrosis. The patient notes fatigue, and nausea. These are similar to her history of eosinophilic esophagitis.  The patient has not experienced vomiting, constipation, diarrhea. The patient has mild limitations in functional activities which can be attributed to the liver disease and to other medical problems that are not related to the liver disease. Her greatest complaint at this time is of long-standing insomnia symptoms, she has difficulty with both initiation and maintenance of sleep. She has lost 20# in the past 3 months with adoption of keto diet. ASSESSMENT AND PLAN:  NAFL/benign steatosis  The diagnosis is based upon Fiboscan CAP score, and imaging. Elastography performed in 9/2018 suggests no fibrosis. If the patient loses 10-20% of current body weight, we would expect to see resolution of steatosis. She has done well in this regard and we have set a goal target of 165#. Will recheck lab values today to document if there has been normalization of liver enzymes. Serologic testing for causes of chronic liver disease were negative. Will repeat Fibroscan in 6 months to document regression of CAP score. If this is improved with normalization of liver enzymes, will consider discharge from practice. The patient was congratulated on her efforts at changes in diet and exercise. She will target 165# as a target. Insomnia  I have given her a trial of trazodone 50 mg for help with sleep initiation. This has been a long-standing problem for her. She will notify me of any issues with this medication. Treatment of other medical problems in patients with chronic liver disease  There are no contraindications for the patient to take any medications that are necessary for treatment of other medical issues. The patient does not consume alcohol daily. Normal doses of acetaminophen can be used for pain as needed. Normal doses of acetaminophen as recommended on the label are not hepatotoxic, even in patients with cirrhosis. The patient does not have cirrhosis.   Normal doses of NSAIDs can be used for pain as needed. The patient can specifically take oral contraception medications without restrictions. Counseling for alcohol in patients with chronic liver disease  The patient was counseled regarding alcohol consumption and the effect of alcohol on chronic liver disease. The patient does not consume any significant amount of alcohol. Vaccinations   The need for vaccination against viral hepatitis A and B will be assessed with serologic and instituted as appropriate. Routine vaccinations against other bacterial and viral agents can be performed as indicated. Annual flu vaccination should be administered if indicated. ALLERGIES  No Known Allergies    MEDICATIONS  Current Outpatient Medications   Medication Sig    acetaminophen (TYLENOL) 325 mg tablet Take  by mouth every four (4) hours as needed for Pain.  SUMAtriptan (IMITREX) 25 mg tablet Take 25 mg by mouth as needed.  famotidine (PEPCID) 40 mg tablet Take 40 mg by mouth nightly. No current facility-administered medications for this visit. SYSTEM REVIEW NOT RELATED TO LIVER DISEASE OR REVIEWED ABOVE:  Constitution systems: Negative for fever, chills, weight gain, weight loss. Eyes: Negative for visual changes. ENT: Negative for sore throat, painful swallowing. Respiratory: Negative for cough, hemoptysis, SOB. Cardiology: Negative for chest pain, palpitations. GI:  Negative for constipation or diarrhea. : Negative for urinary frequency, dysuria, hematuria, nocturia. Skin: Negative for rash. Hematology: Negative for easy bruising, blood clots. Musculo-skeletal: Negative for back pain, muscle pain, weakness. Neurologic: Negative for headaches, dizziness, vertigo, memory problems not related to HE. Psychology: Negative for anxiety, depression. FAMILY HISTORY:  The patient has no knowledge of the father's medical condition. The mother has the following chronic diseases: alcoholism.      There is no family history of liver disease. SOCIAL HISTORY:  The patient is . The patient has 3 children. The patient has never used tobacco products. The patient has never consumed significant amounts of alcohol. 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 PHYSICAL EXAMINATION:  VS: per nursing note  General: No acute distress. Eyes: Sclera anicteric. ENT: No oral lesions. Thyroid normal.  Nodes: No adenopathy. Skin: No spider angiomata. No jaundice. No palmar erythema. Respiratory: Lungs clear to auscultation. Cardiovascular: Regular heart rate. No murmurs. No JVD. Abdomen: Soft non-tender, liver size normal to percussion/palpation. Spleen not palpable. No obvious ascites. Extremities: No edema. No muscle wasting. No gross arthritic changes. Neurologic: Alert and oriented. Cranial nerves grossly intact. No asterixis. LABORATORY STUDIES:  From 8/2018  AST/ALT/ALP/T Bili/ALB:  46/61/87/1.2/4.6  WBC/HB/PLT/INR:  6.1/14.7/362    Additional lab values drawn at today's office visit are pending at the time of documentation. SEROLOGIES:  8/2018. HAV total positive, HBsAntigen negative, anti-HBsurface positive, anti-HCV negative, Ferritin 64, iron saturation 28%, ASMA negative, ceruloplasmin 32. LIVER HISTOLOGY:  9/2018. [facility]. EkPa was 6.1. IQR/med 3%. . The results suggested a fibrosis level of F0-1. The CAP score suggests fatty liver. ENDOSCOPIC PROCEDURES:  Not available or performed    RADIOLOGY:  Not available. This reportedly showed steatosis. OTHER TESTING:  Not available or performed      All of the issues listed in the Assessment and Plan were discussed with the patient. All questions were answered. The patient expressed a clear understanding of the above.     [address] 87 in 6 months to monitor the impact of diet and weight loss and repeat Fibroscan.      Angela Bonner PA-C  Liver [place] [address], [address] [address]  22. [phone]

## 2019-05-03 NOTE — PROGRESS NOTES
1. Have you been to the ER, urgent care clinic since your last visit? Hospitalized since your last visit? No    2. Have you seen or consulted any other health care providers outside of the [address] [place] [address] since your last visit? Include any pap smears or colon screening. No     Chief Complaint   Patient presents with    Follow-up     Visit Vitals  /65 (BP 1 Location: Left arm, BP Patient Position: Sitting)   Pulse 71   Temp 99.5 °F (37.5 °C) (Tympanic)   Ht 5' 7\" (1.702 m)   Wt 176 lb 9.6 oz (80.1 kg)   SpO2 99%   BMI 27.66 kg/m²     3 most recent PHQ Screens 5/3/2019   Little interest or pleasure in doing things Not at all   Feeling down, depressed, irritable, or hopeless Not at all   Total Score PHQ 2 0     Learning Assessment 5/3/2019   PRIMARY LEARNER Patient   HIGHEST LEVEL OF EDUCATION - PRIMARY LEARNER  -   BARRIERS PRIMARY LEARNER NONE   CO-LEARNER CAREGIVER No   PRIMARY LANGUAGE ENGLISH   LEARNER PREFERENCE PRIMARY LISTENING     -   ANSWERED BY patient   RELATIONSHIP SELF     Abuse Screening Questionnaire 5/3/2019   Do you ever feel afraid of your partner? N   Are you in a relationship with someone who physically or mentally threatens you? N   Is it safe for you to go home?  Matilde Brock

## 2019-05-04 LAB
ALBUMIN SERPL-MCNC: 4.8 G/DL (ref 3.5–5.5)
ALP SERPL-CCNC: 69 IU/L (ref 39–117)
ALT SERPL-CCNC: 15 IU/L (ref 0–32)
AST SERPL-CCNC: 12 IU/L (ref 0–40)
BILIRUB DIRECT SERPL-MCNC: 0.21 MG/DL (ref 0–0.4)
BILIRUB SERPL-MCNC: 0.8 MG/DL (ref 0–1.2)

## 2019-05-06 NOTE — PROGRESS NOTES
Pt notified of normalization of liver enzymes with her marked weight losses. Follow-up as scheduled.

## 2019-05-17 ENCOUNTER — OFFICE VISIT (OUTPATIENT)
Dept: FAMILY MEDICINE CLINIC | Age: 34
End: 2019-05-17

## 2019-05-17 VITALS
BODY MASS INDEX: 27.13 KG/M2 | TEMPERATURE: 97 F | OXYGEN SATURATION: 99 % | WEIGHT: 173.2 LBS | HEART RATE: 82 BPM | SYSTOLIC BLOOD PRESSURE: 112 MMHG | DIASTOLIC BLOOD PRESSURE: 84 MMHG | RESPIRATION RATE: 18 BRPM

## 2019-05-17 DIAGNOSIS — M25.531 RIGHT WRIST PAIN: Primary | ICD-10-CM

## 2019-05-17 RX ORDER — METHYLPREDNISOLONE 4 MG/1
TABLET ORAL
Qty: 1 DOSE PACK | Refills: 0 | Status: SHIPPED | OUTPATIENT
Start: 2019-05-17 | End: 2019-06-26 | Stop reason: ALTCHOICE

## 2019-05-17 RX ORDER — DICLOFENAC SODIUM 75 MG/1
75 TABLET, DELAYED RELEASE ORAL 2 TIMES DAILY
Qty: 60 TAB | Refills: 0 | Status: SHIPPED | OUTPATIENT
Start: 2019-05-17 | End: 2019-10-10 | Stop reason: ALTCHOICE

## 2019-05-17 NOTE — PATIENT INSTRUCTIONS
Joint Pain: Care Instructions  Your Care Instructions    Many people have small aches and pains from overuse or injury to muscles and joints. Joint injuries often happen during sports or recreation, work tasks, or projects around the home. An overuse injury can happen when you put too much stress on a joint or when you do an activity that stresses the joint over and over, such as using the computer or rowing a boat. You can take action at home to help your muscles and joints get better. You should feel better in 1 to 2 weeks, but it can take 3 months or more to heal completely. Follow-up care is a key part of your treatment and safety. Be sure to make and go to all appointments, and call your doctor if you are having problems. It's also a good idea to know your test results and keep a list of the medicines you take. How can you care for yourself at home? · Do not put weight on the injured joint for at least a day or two. · For the first day or two after an injury, do not take hot showers or baths, and do not use hot packs. The heat could make swelling worse. · Put ice or a cold pack on the sore joint for 10 to 20 minutes at a time. Try to do this every 1 to 2 hours for the next 3 days (when you are awake) or until the swelling goes down. Put a thin cloth between the ice and your skin. · Wrap the injury in an elastic bandage. Do not wrap it too tightly because this can cause more swelling. · Prop up the sore joint on a pillow when you ice it or anytime you sit or lie down during the next 3 days. Try to keep it above the level of your heart. This will help reduce swelling. · Take an over-the-counter pain medicine, such as acetaminophen (Tylenol), ibuprofen (Advil, Motrin), or naproxen (Aleve). Read and follow all instructions on the label. · After 1 or 2 days of rest, begin moving the joint gently.  While the joint is still healing, you can begin to exercise using activities that do not strain or hurt the painful joint. When should you call for help? Call your doctor now or seek immediate medical care if:    · You have signs of infection, such as:  ? Increased pain, swelling, warmth, and redness. ? Red streaks leading from the joint. ? A fever.    Watch closely for changes in your health, and be sure to contact your doctor if:    · Your movement or symptoms are not getting better after 1 to 2 weeks of home treatment. Where can you learn more? Go to http://wes-mohamud.info/. Enter P205 in the search box to learn more about \"Joint Pain: Care Instructions. \"  Current as of: September 20, 2018  Content Version: 11.9  © 6418-2172 [employer] [place]. Care instructions adapted under license by [employer] (which disclaims liability or warranty for this information). If you have questions about a medical condition or this instruction, always ask your healthcare professional. [address] any warranty or liability for your use of this information.

## 2019-05-17 NOTE — PROGRESS NOTES
HISTORY OF PRESENT ILLNESS  Abdirashid Fortune is a 28 y.o. female. HPI   Pt presents with \"right wrist pain\"  Pt states that she has been dealing with pain in her right wrist for a while, but over the past 2 weeks, it has been getting drastically worse. She describes the pain as joint pain, aching  At times, the wrist will be swollen  No erythema  She notes that repetitive motions, makes the pain worse  No numbness or tingling in hand or wrist  OTC: Tylenol and Aleve  Review of Systems   Constitutional: Negative for fever. HENT: Negative for congestion. Respiratory: Negative for cough. Gastrointestinal: Negative for diarrhea and vomiting. Musculoskeletal: Positive for joint pain. Physical Exam   Constitutional: She is oriented to person, place, and time. She appears well-developed and well-nourished. HENT:   Head: Normocephalic and atraumatic. Cardiovascular: Normal rate, regular rhythm and normal heart sounds. Pulmonary/Chest: Effort normal and breath sounds normal.   Musculoskeletal:        Right wrist: She exhibits swelling. She exhibits normal range of motion. Neurological: She is alert and oriented to person, place, and time. Skin: Skin is warm and dry. Psychiatric: She has a normal mood and affect. Her behavior is normal.       ASSESSMENT and PLAN    ICD-10-CM ICD-9-CM    1. Right wrist pain M25.531 719.43 XR WRIST RT AP/LAT      methylPREDNISolone (MEDROL DOSEPACK) 4 mg tablet      diclofenac EC (VOLTAREN) 75 mg EC tablet     Educated that we will notify when x-ray returns, and inform her of any change in plan of care at that time. Pt informed to return to office with worsening of symptoms, or PRN with any questions or concerns. Pt verbalizes understanding of plan of care and denies further questions or concerns at this time.

## 2019-05-17 NOTE — PROGRESS NOTES
Rosa Hidalgo is a 28 y.o. female    Chief Complaint   Patient presents with    Wrist Pain     Right wrist pain over 14 days which increase during activity        1. Have you been to the ER, urgent care clinic since your last visit? Hospitalized since your last visit? No  M  2. Have you seen or consulted any other health care providers outside of the [address] [place] [address] since your last visit? Include any pap smears or colon screening. No      Visit Vitals  /84 (BP 1 Location: Left arm, BP Patient Position: Sitting)   Pulse 82   Temp 97 °F (36.1 °C) (Oral)   Resp 18   Wt 173 lb 3.2 oz (78.6 kg)   SpO2 99%   BMI 27.13 kg/m²           Health Maintenance Due   Topic Date Due    PAP AKA CERVICAL CYTOLOGY  06/22/2005         Medication Reconciliation completed, changes noted.   Please  Update medication list.

## 2019-06-26 ENCOUNTER — OFFICE VISIT (OUTPATIENT)
Dept: FAMILY MEDICINE CLINIC | Age: 34
End: 2019-06-26

## 2019-06-26 VITALS
TEMPERATURE: 98.8 F | OXYGEN SATURATION: 98 % | SYSTOLIC BLOOD PRESSURE: 134 MMHG | HEIGHT: 67 IN | RESPIRATION RATE: 18 BRPM | DIASTOLIC BLOOD PRESSURE: 88 MMHG | HEART RATE: 97 BPM | BODY MASS INDEX: 26.74 KG/M2 | WEIGHT: 170.4 LBS

## 2019-06-26 DIAGNOSIS — J01.00 ACUTE NON-RECURRENT MAXILLARY SINUSITIS: Primary | ICD-10-CM

## 2019-06-26 RX ORDER — CEFDINIR 300 MG/1
300 CAPSULE ORAL 2 TIMES DAILY
Qty: 20 CAP | Refills: 0 | Status: SHIPPED | OUTPATIENT
Start: 2019-06-26 | End: 2019-07-06

## 2019-06-26 NOTE — PATIENT INSTRUCTIONS
Sinusitis: Care Instructions  Your Care Instructions    Sinusitis is an infection of the lining of the sinus cavities in your head. Sinusitis often follows a cold. It causes pain and pressure in your head and face. In most cases, sinusitis gets better on its own in 1 to 2 weeks. But some mild symptoms may last for several weeks. Sometimes antibiotics are needed. Follow-up care is a key part of your treatment and safety. Be sure to make and go to all appointments, and call your doctor if you are having problems. It's also a good idea to know your test results and keep a list of the medicines you take. How can you care for yourself at home? · Take an over-the-counter pain medicine, such as acetaminophen (Tylenol), ibuprofen (Advil, Motrin), or naproxen (Aleve). Read and follow all instructions on the label. · If the doctor prescribed antibiotics, take them as directed. Do not stop taking them just because you feel better. You need to take the full course of antibiotics. · Be careful when taking over-the-counter cold or flu medicines and Tylenol at the same time. Many of these medicines have acetaminophen, which is Tylenol. Read the labels to make sure that you are not taking more than the recommended dose. Too much acetaminophen (Tylenol) can be harmful. · Breathe warm, moist air from a steamy shower, a hot bath, or a sink filled with hot water. Avoid cold, dry air. Using a humidifier in your home may help. Follow the directions for cleaning the machine. · Use saline (saltwater) nasal washes to help keep your nasal passages open and wash out mucus and bacteria. You can buy saline nose drops at a grocery store or drugstore. Or you can make your own at home by adding 1 teaspoon of salt and 1 teaspoon of baking soda to 2 cups of distilled water. If you make your own, fill a bulb syringe with the solution, insert the tip into your nostril, and squeeze gently. [place] your nose.   · Put a hot, wet towel or a warm gel pack on your face 3 or 4 times a day for 5 to 10 minutes each time. · Try a decongestant nasal spray like oxymetazoline (Afrin). Do not use it for more than 3 days in a row. Using it for more than 3 days can make your congestion worse. When should you call for help? Call your doctor now or seek immediate medical care if:    · You have new or worse swelling or redness in your face or around your eyes.     · You have a new or higher fever.    Watch closely for changes in your health, and be sure to contact your doctor if:    · You have new or worse facial pain.     · The mucus from your nose becomes thicker (like pus) or has new blood in it.     · You are not getting better as expected. Where can you learn more? Go to http://wes-mohamud.info/. Enter F950 in the search box to learn more about \"Sinusitis: Care Instructions. \"  Current as of: March 27, 2018  Content Version: 11.9  © 6075-8363 [employer]. Care instructions adapted under license by [employer] (which disclaims liability or warranty for this information). If you have questions about a medical condition or this instruction, always ask your healthcare professional. [address] any warranty or liability for your use of this information.

## 2019-06-26 NOTE — PROGRESS NOTES
HISTORY OF PRESENT ILLNESS  Lucian Perez is a 27 y.o. female. HPI   Pt presents with \"cold symptoms\"  Symptoms have been present for 4 days  Sore throat  Nasal congestion  Sinus pain and pressure  Ear pain  Cough, productive at times  OTC: Nyquil, Dayquil, Tylenol sinus  No documented fever  Review of Systems   Constitutional: Negative for fever. HENT: Positive for congestion, ear pain, sinus pain and sore throat. Respiratory: Positive for cough and sputum production. Gastrointestinal: Negative for diarrhea and vomiting. Physical Exam   Constitutional: She is oriented to person, place, and time. She appears well-developed and well-nourished. HENT:   Head: Normocephalic and atraumatic. Right Ear: Hearing, tympanic membrane, external ear and ear canal normal.   Left Ear: Hearing, tympanic membrane, external ear and ear canal normal.   Nose: Mucosal edema and rhinorrhea present. Right sinus exhibits maxillary sinus tenderness. Left sinus exhibits maxillary sinus tenderness. Mouth/Throat: Oropharynx is clear and moist.   Neck: Normal range of motion. Neck supple. Cardiovascular: Normal rate, regular rhythm and normal heart sounds. Pulmonary/Chest: Effort normal and breath sounds normal.   Lymphadenopathy:     She has no cervical adenopathy. Neurological: She is alert and oriented to person, place, and time. Skin: Skin is warm and dry. Psychiatric: She has a normal mood and affect. Her behavior is normal.       ASSESSMENT and PLAN    ICD-10-CM ICD-9-CM    1. Acute non-recurrent maxillary sinusitis J01.00 461.0 cefdinir (OMNICEF) 300 mg capsule     Informed patient that I have sent medication to the pharmacy, and she should take as prescribed  Educated about staying well hydrated, and treating fever as needed    Pt informed to return to office with worsening of symptoms, or PRN with any questions or concerns.   Pt verbalizes understanding of plan of care and denies further questions or concerns at this time.

## 2019-06-26 NOTE — PROGRESS NOTES
Steven Carreon is a 27 y.o. female    Chief Complaint   Patient presents with    Sinus Pain     left and right eye    Cough    Nasal Congestion    Sore Throat     mild but worst during cough        1. Have you been to the ER, urgent care clinic since your last visit? Hospitalized since your last visit? No  M  2. Have you seen or consulted any other health care providers outside of the [address] [place] [address] since your last visit? Include any pap smears or colon screening. No      Visit Vitals  /88 (BP 1 Location: Left arm, BP Patient Position: Sitting)   Pulse 97   Temp 98.8 °F (37.1 °C) (Oral)   Resp 18   Ht 5' 7\" (1.702 m)   Wt 170 lb 6.4 oz (77.3 kg)   SpO2 98%   BMI 26.69 kg/m²           Health Maintenance Due   Topic Date Due    PAP AKA CERVICAL CYTOLOGY  06/22/2005         Medication Reconciliation completed, changes noted.   Please  Update medication list.

## 2019-06-26 NOTE — LETTER
NOTIFICATION RETURN TO WORK / SCHOOL 
 
6/26/2019 11:55 AM 
 
Ms. Robert Leblanc John [id number] [facility]  [phone] To Whom It May Concern: 
 
Carrol Whitley is currently under the care of [address] [place] [address]. She needs to be excused from work on 6/26, due to illness. If there are questions or concerns please have the patient contact our office. Sincerely, Desmond Maria NP

## 2019-10-10 ENCOUNTER — OFFICE VISIT (OUTPATIENT)
Dept: FAMILY MEDICINE CLINIC | Age: 34
End: 2019-10-10

## 2019-10-10 VITALS
DIASTOLIC BLOOD PRESSURE: 75 MMHG | WEIGHT: 171 LBS | SYSTOLIC BLOOD PRESSURE: 107 MMHG | HEIGHT: 67 IN | OXYGEN SATURATION: 98 % | HEART RATE: 83 BPM | RESPIRATION RATE: 16 BRPM | BODY MASS INDEX: 26.84 KG/M2 | TEMPERATURE: 98.4 F

## 2019-10-10 DIAGNOSIS — R11.2 NAUSEA AND VOMITING, INTRACTABILITY OF VOMITING NOT SPECIFIED, UNSPECIFIED VOMITING TYPE: Primary | ICD-10-CM

## 2019-10-10 RX ORDER — SPIRONOLACTONE 50 MG/1
TABLET, FILM COATED ORAL
Refills: 3 | COMMUNITY
Start: 2019-08-07 | End: 2020-02-26 | Stop reason: DRUGHIGH

## 2019-10-10 RX ORDER — DAPSONE 50 MG/G
GEL TOPICAL
COMMUNITY
Start: 2019-09-10 | End: 2020-07-15

## 2019-10-10 RX ORDER — SODIUM SULFACETAMIDE AND SULFUR 80; 40 MG/ML; MG/ML
LOTION TOPICAL
COMMUNITY
Start: 2019-08-09 | End: 2021-01-19 | Stop reason: ALTCHOICE

## 2019-10-10 RX ORDER — ONDANSETRON 8 MG/1
8 TABLET, ORALLY DISINTEGRATING ORAL
Qty: 12 TAB | Refills: 0 | Status: SHIPPED | OUTPATIENT
Start: 2019-10-10 | End: 2020-07-15

## 2019-10-10 NOTE — LETTER
NOTIFICATION RETURN TO WORK / SCHOOL 
 
10/10/2019 10:27 AM 
 
Ms. Fabiana Lopez [id number] [facility]  [phone] To Whom It May Concern: 
 
Kristina Cardoso is currently under the care of [address] [place] [address]. She needs to be excused from work on 10/10, due to illness If there are questions or concerns please have the patient contact our office. Sincerely, Ana Bryan, NP 
 
                                
 

- - -

## 2019-10-10 NOTE — PATIENT INSTRUCTIONS
Nausea and Vomiting: Care Instructions  Your Care Instructions    When you are nauseated, you may feel weak and sweaty and notice a lot of saliva in your mouth. Nausea often leads to vomiting. Most of the time you do not need to worry about nausea and vomiting, but they can be signs of other illnesses. Two common causes of nausea and vomiting are stomach flu and food poisoning. Nausea and vomiting from viral stomach flu will usually start to improve within 24 hours. Nausea and vomiting from food poisoning may last from 12 to 48 hours. The doctor has checked you carefully, but problems can develop later. If you notice any problems or new symptoms, get medical treatment right away. Follow-up care is a key part of your treatment and safety. Be sure to make and go to all appointments, and call your doctor if you are having problems. It's also a good idea to know your test results and keep a list of the medicines you take. How can you care for yourself at home? · To prevent dehydration, drink plenty of fluids, enough so that your urine is light yellow or clear like water. Choose water and other caffeine-free clear liquids until you feel better. If you have kidney, heart, or liver disease and have to limit fluids, talk with your doctor before you increase the amount of fluids you drink. · Rest in bed until you feel better. · When you are able to eat, try clear soups, mild foods, and liquids until all symptoms are gone for 12 to 48 hours. Other good choices include dry toast, crackers, cooked cereal, and gelatin dessert, such as Jell-O. When should you call for help? Call 911 anytime you think you may need emergency care. For example, call if:    · You passed out (lost consciousness).    Call your doctor now or seek immediate medical care if:    · You have symptoms of dehydration, such as:  ? Dry eyes and a dry mouth. ? Passing only a little dark urine. ?  Feeling thirstier than usual.     · You have new or worsening belly pain.     · You have a new or higher fever.     · You vomit blood or what looks like coffee grounds.    Watch closely for changes in your health, and be sure to contact your doctor if:    · You have ongoing nausea and vomiting.     · Your vomiting is getting worse.     · Your vomiting lasts longer than 2 days.     · You are not getting better as expected. Where can you learn more? Go to http://wes-mohamud.info/. Enter [phone] [id number] in the search box to learn more about \"Nausea and Vomiting: Care Instructions. \"  Current as of: June 26, 2019  Content Version: 12.2  © 2874-4358 [employer]. Care instructions adapted under license by [employer] (which disclaims liability or warranty for this information). If you have questions about a medical condition or this instruction, always ask your healthcare professional. [address] any warranty or liability for your use of this information.

## 2019-10-10 NOTE — PROGRESS NOTES
HISTORY OF PRESENT ILLNESS  Lisa Noble is a 27 y.o. female. HPI   Pt presents with [place] and vomiting\"  Symptoms started 2 days ago  She believes that she has a viral GI bug  Symptoms stated with diarrhea  Then, vomiting  She is nauseated, and does not want to eat  She did eat some crackers yesterday  She has not vomited since yesterday  No fever  Abdominal cramping but no abdominal pain  OTC: immodium, stopped her diarrhea  Review of Systems   Constitutional: Negative for fever. HENT: Negative for congestion. Gastrointestinal: Positive for diarrhea, nausea and vomiting. Negative for abdominal pain. Physical Exam   Constitutional: She is oriented to person, place, and time. She appears well-developed and well-nourished. HENT:   Head: Normocephalic and atraumatic. Cardiovascular: Normal rate, regular rhythm and normal heart sounds. Pulmonary/Chest: Effort normal and breath sounds normal.   Abdominal: Soft. Bowel sounds are normal. She exhibits no distension and no mass. There is no tenderness. There is no rebound and no guarding. Neurological: She is alert and oriented to person, place, and time. Skin: Skin is warm and dry. Psychiatric: She has a normal mood and affect. Her behavior is normal.       ASSESSMENT and PLAN    ICD-10-CM ICD-9-CM    1. Nausea and vomiting, intractability of vomiting not specified, unspecified vomiting type R11.2 787.01 ondansetron (ZOFRAN ODT) 8 mg disintegrating tablet     Educated about taking Zofran as prescribed  Should symptoms not improve in 24 hours, informed patient to notify office    Pt informed to return to office with worsening of symptoms, or PRN with any questions or concerns. Pt verbalizes understanding of plan of care and denies further questions or concerns at this time.

## 2019-10-10 NOTE — PROGRESS NOTES
Identified pt with two pt identifiers(name and ). Chief Complaint   Patient presents with    Nausea     feels she has a GI virus    Vomiting     has not vomited today - has been getting sick over the past 3 days and needs something for nausea        Health Maintenance Due   Topic    PAP AKA CERVICAL CYTOLOGY     Influenza Age 5 to Adult        Wt Readings from Last 3 Encounters:   10/10/19 171 lb (77.6 kg)   19 170 lb 6.4 oz (77.3 kg)   19 173 lb 3.2 oz (78.6 kg)     Temp Readings from Last 3 Encounters:   10/10/19 98.4 °F (36.9 °C) (Oral)   19 98.8 °F (37.1 °C) (Oral)   19 97 °F (36.1 °C) (Oral)     BP Readings from Last 3 Encounters:   10/10/19 107/75   19 134/88   19 112/84     Pulse Readings from Last 3 Encounters:   10/10/19 83   19 97   19 82         Learning Assessment:  :     Learning Assessment 5/3/2019 2018 2018   PRIMARY LEARNER Patient Patient Patient   HIGHEST LEVEL OF EDUCATION - PRIMARY LEARNER  - SOME COLLEGE -   BARRIERS PRIMARY LEARNER NONE NONE NONE   CO-LEARNER CAREGIVER No - No   PRIMARY LANGUAGE ENGLISH ENGLISH ENGLISH   LEARNER PREFERENCE PRIMARY LISTENING VIDEOS DEMONSTRATION     - - LISTENING   ANSWERED BY patient patient patient   RELATIONSHIP SELF SELF SELF       Depression Screening:  :     3 most recent PHQ Screens 5/3/2019   Little interest or pleasure in doing things Not at all   Feeling down, depressed, irritable, or hopeless Not at all   Total Score PHQ 2 0       Fall Risk Assessment:  :     No flowsheet data found. Abuse Screening:  :     Abuse Screening Questionnaire 5/3/2019 2018   Do you ever feel afraid of your partner? N N   Are you in a relationship with someone who physically or mentally threatens you? N N   Is it safe for you to go home?  Y Y       Coordination of Care Questionnaire:  :     1) Have you been to an emergency room, urgent care clinic since your last visit? no   Hospitalized since your last visit? no             2) Have you seen or consulted any other health care providers outside of [address] [place] [address] since your last visit? no  (Include any pap smears or colon screenings in this section.)    3) Do you have an Advance Directive on file? no  Are you interested in receiving information about Advance Directives? no      Reviewed record in preparation for visit and have obtained necessary documentation. Medication reconciliation up to date and corrected with patient at this time.

## 2019-10-29 ENCOUNTER — OFFICE VISIT (OUTPATIENT)
Dept: FAMILY MEDICINE CLINIC | Age: 34
End: 2019-10-29

## 2019-10-29 VITALS
WEIGHT: 171 LBS | TEMPERATURE: 98.4 F | BODY MASS INDEX: 26.84 KG/M2 | DIASTOLIC BLOOD PRESSURE: 80 MMHG | HEART RATE: 81 BPM | RESPIRATION RATE: 16 BRPM | SYSTOLIC BLOOD PRESSURE: 125 MMHG | HEIGHT: 67 IN | OXYGEN SATURATION: 98 %

## 2019-10-29 DIAGNOSIS — Z20.818 EXPOSURE TO STREP THROAT: ICD-10-CM

## 2019-10-29 DIAGNOSIS — J02.9 SORE THROAT: Primary | ICD-10-CM

## 2019-10-29 DIAGNOSIS — Z87.09 HISTORY OF STREP PHARYNGITIS: ICD-10-CM

## 2019-10-29 LAB — S PYO AG THROAT QL: NEGATIVE

## 2019-10-29 RX ORDER — AMOXICILLIN 500 MG/1
500 CAPSULE ORAL 2 TIMES DAILY
Qty: 20 CAP | Refills: 0 | Status: SHIPPED | OUTPATIENT
Start: 2019-10-29 | End: 2019-11-08

## 2019-10-29 NOTE — PATIENT INSTRUCTIONS
Sore Throat: Care Instructions  Your Care Instructions    Infection by bacteria or a virus causes most sore throats. Cigarette smoke, dry air, air pollution, allergies, and yelling can also cause a sore throat. Sore throats can be painful and annoying. Fortunately, most sore throats go away on their own. If you have a bacterial infection, your doctor may prescribe antibiotics. Follow-up care is a key part of your treatment and safety. Be sure to make and go to all appointments, and call your doctor if you are having problems. It's also a good idea to know your test results and keep a list of the medicines you take. How can you care for yourself at home? · If your doctor prescribed antibiotics, take them as directed. Do not stop taking them just because you feel better. You need to take the full course of antibiotics. · Gargle with warm salt water once an hour to help reduce swelling and relieve discomfort. Use 1 teaspoon of salt mixed in 1 cup of warm water. · Take an over-the-counter pain medicine, such as acetaminophen (Tylenol), ibuprofen (Advil, Motrin), or naproxen (Aleve). Read and follow all instructions on the label. · Be careful when taking over-the-counter cold or flu medicines and Tylenol at the same time. Many of these medicines have acetaminophen, which is Tylenol. Read the labels to make sure that you are not taking more than the recommended dose. Too much acetaminophen (Tylenol) can be harmful. · Drink plenty of fluids. Fluids may help soothe an irritated throat. Hot fluids, such as tea or soup, may help decrease throat pain. · Use over-the-counter throat lozenges to soothe pain. Regular cough drops or hard candy may also help. These should not be given to young children because of the risk of choking. · Do not smoke or allow others to smoke around you. If you need help quitting, talk to your doctor about stop-smoking programs and medicines.  These can increase your chances of quitting for good. · Use a vaporizer or humidifier to add moisture to your bedroom. Follow the directions for cleaning the machine. When should you call for help? Call your doctor now or seek immediate medical care if:    · You have new or worse trouble swallowing.     · Your sore throat gets much worse on one side.    Watch closely for changes in your health, and be sure to contact your doctor if you do not get better as expected. Where can you learn more? Go to http://wes-mohamud.info/. Enter [id number] [id number] [phone] in the search box to learn more about \"Sore Throat: Care Instructions. \"  Current as of: October 21, 2018  Content Version: 12.2  © 2832-4539 [employer]. Care instructions adapted under license by [employer] (which disclaims liability or warranty for this information). If you have questions about a medical condition or this instruction, always ask your healthcare professional. [address] any warranty or liability for your use of this information.

## 2019-10-29 NOTE — PROGRESS NOTES
HISTORY OF PRESENT ILLNESS  Marion White is a 27 y.o. female. HPI   Pt presents with \"sore throat\"  Symptoms started 3 days ago  Sore throat  Swollen tonsils  Very painful throat  Headache  No fever  OTC: none  Review of Systems   Constitutional: Negative for fever. HENT: Positive for sore throat. Gastrointestinal: Negative for diarrhea and vomiting. Physical Exam   Constitutional: She is oriented to person, place, and time. She appears well-developed and well-nourished. HENT:   Head: Normocephalic and atraumatic. Right Ear: Hearing, tympanic membrane, external ear and ear canal normal.   Left Ear: Hearing, tympanic membrane, external ear and ear canal normal.   Nose: Mucosal edema and rhinorrhea present. Mouth/Throat: Posterior oropharyngeal edema and posterior oropharyngeal erythema present. Neck: Normal range of motion. Neck supple. Cardiovascular: Normal rate, regular rhythm and normal heart sounds. Pulmonary/Chest: Effort normal and breath sounds normal.   Lymphadenopathy:     She has cervical adenopathy. Right cervical: Superficial cervical adenopathy present. Left cervical: Superficial cervical adenopathy present. Neurological: She is alert and oriented to person, place, and time. Skin: Skin is warm and dry. Psychiatric: She has a normal mood and affect. Her behavior is normal.       ASSESSMENT and PLAN    ICD-10-CM ICD-9-CM    1. Sore throat J02.9 462 AMB POC RAPID STREP TEST      amoxicillin (AMOXIL) 500 mg capsule   2. History of strep pharyngitis Z87.09 V12.09 AMB POC RAPID STREP TEST   3. Exposure to strep throat Z20.818 V01.89 AMB POC RAPID STREP TEST     Educated about taking medication as prescribed, with food  Should stay well hydrated, and treat fever as needed    Pt informed to return to office with worsening of symptoms, or PRN with any questions or concerns.   Pt verbalizes understanding of plan of care and denies further questions or concerns at this time.

## 2019-10-29 NOTE — PROGRESS NOTES
Identified pt with two pt identifiers(name and ). Chief Complaint   Patient presents with    Sore Throat     exposure to strep throat         Health Maintenance Due   Topic    PAP AKA CERVICAL CYTOLOGY     Influenza Age 5 to Adult        Wt Readings from Last 3 Encounters:   10/29/19 171 lb (77.6 kg)   10/10/19 171 lb (77.6 kg)   19 170 lb 6.4 oz (77.3 kg)     Temp Readings from Last 3 Encounters:   10/29/19 98.4 °F (36.9 °C) (Oral)   10/10/19 98.4 °F (36.9 °C) (Oral)   19 98.8 °F (37.1 °C) (Oral)     BP Readings from Last 3 Encounters:   10/29/19 125/80   10/10/19 107/75   19 134/88     Pulse Readings from Last 3 Encounters:   10/29/19 81   10/10/19 83   19 97         Learning Assessment:  :     Learning Assessment 5/3/2019 2018 2018   PRIMARY LEARNER Patient Patient Patient   HIGHEST LEVEL OF EDUCATION - PRIMARY LEARNER  - SOME COLLEGE -   BARRIERS PRIMARY LEARNER NONE NONE NONE   CO-LEARNER CAREGIVER No - No   PRIMARY LANGUAGE ENGLISH ENGLISH ENGLISH   LEARNER PREFERENCE PRIMARY LISTENING VIDEOS DEMONSTRATION     - - LISTENING   ANSWERED BY patient patient patient   RELATIONSHIP SELF SELF SELF       Depression Screening:  :     3 most recent PHQ Screens 5/3/2019   Little interest or pleasure in doing things Not at all   Feeling down, depressed, irritable, or hopeless Not at all   Total Score PHQ 2 0       Fall Risk Assessment:  :     No flowsheet data found. Abuse Screening:  :     Abuse Screening Questionnaire 5/3/2019 2018   Do you ever feel afraid of your partner? N N   Are you in a relationship with someone who physically or mentally threatens you? N N   Is it safe for you to go home?  Y Y       Coordination of Care Questionnaire:  :     1) Have you been to an emergency room, urgent care clinic since your last visit? no   Hospitalized since your last visit? no             2) Have you seen or consulted any other health care providers outside of [facility] since your last visit? no  (Include any pap smears or colon screenings in this section.)    3) Do you have an Advance Directive on file? no  Are you interested in receiving information about Advance Directives? no      Reviewed record in preparation for visit and have obtained necessary documentation. Medication reconciliation up to date and corrected with patient at this time. Order for POC Rapid Strep Testing placed per [facility] Mouna's verbal order.

## 2019-10-29 NOTE — LETTER
NOTIFICATION RETURN TO WORK / SCHOOL 
 
10/29/2019 2:38 PM 
 
Ms. Camacho Traylor John [address] [id number] [facility]  [id number] To Whom It May Concern: 
 
Bell Cabrales is currently under the care of [address] [place] [address]. She needs to be excused from work on 10/29, due to illness If there are questions or concerns please have the patient contact our office. Sincerely, Kristal Dugan NP

## 2019-11-04 ENCOUNTER — OFFICE VISIT (OUTPATIENT)
Dept: HEMATOLOGY | Age: 34
End: 2019-11-04

## 2019-11-04 VITALS
DIASTOLIC BLOOD PRESSURE: 73 MMHG | WEIGHT: 176.4 LBS | HEIGHT: 67 IN | BODY MASS INDEX: 27.69 KG/M2 | HEART RATE: 71 BPM | OXYGEN SATURATION: 97 % | TEMPERATURE: 98.6 F | SYSTOLIC BLOOD PRESSURE: 114 MMHG

## 2019-11-04 DIAGNOSIS — R74.8 ELEVATED LIVER ENZYMES: Primary | ICD-10-CM

## 2019-11-04 NOTE — PROGRESS NOTES
[facility] MD [address] Ladona Huddle, Leonor Artist, MD Missie Flicker, ODILON Clement, ACNP-BC     Dianelys Peck, PCNP-BC   Brannon MimsLENA canadaP-TEJA    Gogo Cecilia, [facility] CenterNP-BC       Ricardo Graham [facility] 136    at [address], [address] [place] [address], [facility] 22.    [phone]    FAX: [address] [place] [address]    at [address], [address]    [phone]    FAX: [phone]       Patient Care Team:  Zully Cancer, NP as PCP - General (Nurse Practitioner)  Ivory Cancer, NP as PCP - [facility] Empaneled Provider  Abhinav Garcia RN as Nurse Navigator (Family Practice)      Problem List  Date Reviewed: 10/29/2019          Codes Class Noted    Eosinophilic esophagitis QNP-23-HB: K20.0  ICD-9-CM: 530.13  9/21/2018        NAFL (nonalcoholic fatty liver) PNH-19-OZ: K76.0  ICD-9-CM: 571.8  9/21/2018        Neuropathy ICD-10-CM: G62.9  ICD-9-CM: 355.9  9/21/2018              Marion White returns to the [address] for management of non-alcoholic fatty liver (NAFL). The active problem list, all pertinent past medical history, medications, radiologic findings and laboratory findings related to the liver disorder were reviewed with the patient. The patient is a 27 y.o.  female who is suspected to have fatty liver disease based upon ultrasound. This has also been suggested on the basis of past Fibroscan analysis. Serologic evaluation for markers of chronic liver disease were negative. Ultrasound of the liver was performed in 8/2018. The results of the imaging are not available at this time. The patient believes this suggested fatty liver disease.       Assessment of liver fibrosis was performed in the office 9/2018 with Fibroscan. The result was 6.1 kPa which correlates with no fibrosis. We plan to repeat this study in the office today. The patient notes fatigue, and nausea. These are similar to her history of eosinophilic esophagitis. The patient has not experienced vomiting, constipation, diarrhea. The patient has mild limitations in functional activities which can be attributed to the liver disease and to other medical problems that are not related to the liver disease. Her greatest complaint at this time is of long-standing insomnia symptoms, she has difficulty with both initiation and maintenance of sleep. She has lost 20# in the past 6 months with adoption of keto diet and importantly, she has been able to maintain this loss. ASSESSMENT AND PLAN:  NAFL/benign steatosis  The diagnosis is based upon Fiboscan CAP score, and imaging. Elastography performed in 9/2018 suggests no fibrosis. This again is suggested on today's study. There continues to be elevation of CAP scoring, consistent with steatosis. If the patient loses 10-20% of current body weight, we would expect to see resolution of steatosis. She has done well in this regard and we have set a goal target of 165#. Will recheck lab values today to document if there continues to be normalization of liver enzymes. Serologic testing for causes of chronic liver disease were negative. Will repeat Fibroscan in 12 months to document regression of CAP score. If this is improved with normalization of liver enzymes, will consider discharge from practice. The patient was congratulated on her efforts at changes in diet and exercise. She will target 165# as a target. Treatment of other medical problems in patients with chronic liver disease  There are no contraindications for the patient to take any medications that are necessary for treatment of other medical issues. The patient does not consume alcohol daily.     Normal doses of acetaminophen can be used for pain as needed. Normal doses of acetaminophen as recommended on the label are not hepatotoxic, even in patients with cirrhosis. The patient does not have cirrhosis. Normal doses of NSAIDs can be used for pain as needed. The patient can specifically take oral contraception medications without restrictions. Counseling for alcohol in patients with chronic liver disease  The patient was counseled regarding alcohol consumption and the effect of alcohol on chronic liver disease. The patient does not consume any significant amount of alcohol. Vaccinations   The need for vaccination against viral hepatitis A and B will be assessed with serologic and instituted as appropriate. Routine vaccinations against other bacterial and viral agents can be performed as indicated. Annual flu vaccination should be administered if indicated. ALLERGIES  No Known Allergies    MEDICATIONS  Current Outpatient Medications   Medication Sig    amoxicillin (AMOXIL) 500 mg capsule Take 1 Cap by mouth two (2) times a day for 10 days.  spironolactone (ALDACTONE) 50 mg tablet TAKE 1 TABLET BY MOUTH EVERY DAY    SULFACLEANSE 8-4 8-4 % susp     Dapsone 5 % gel     acetaminophen (TYLENOL) 325 mg tablet Take  by mouth every four (4) hours as needed for Pain.  ondansetron (ZOFRAN ODT) 8 mg disintegrating tablet Take 1 Tab by mouth every eight (8) hours as needed for Nausea.  traZODone (DESYREL) 50 mg tablet Take 1 Tab by mouth nightly. (Patient not taking: Reported on 10/10/2019)    SUMAtriptan (IMITREX) 25 mg tablet Take 25 mg by mouth as needed. Indications: self discontinue not taking     No current facility-administered medications for this visit. SYSTEM REVIEW NOT RELATED TO LIVER DISEASE OR REVIEWED ABOVE:  Constitution systems: Negative for fever, chills, weight gain, weight loss. Eyes: Negative for visual changes. ENT: Negative for sore throat, painful swallowing.    Respiratory: Negative for cough, hemoptysis, SOB. Cardiology: Negative for chest pain, palpitations. GI:  Negative for constipation or diarrhea. : Negative for urinary frequency, dysuria, hematuria, nocturia. Skin: Negative for rash. Hematology: Negative for easy bruising, blood clots. Musculo-skeletal: Negative for back pain, muscle pain, weakness. Neurologic: Negative for headaches, dizziness, vertigo, memory problems not related to HE. Psychology: Negative for anxiety, depression. FAMILY HISTORY:  The patient has no knowledge of the father's medical condition. The mother has the following chronic diseases: alcoholism. There is no family history of liver disease. SOCIAL HISTORY:  The patient is . The patient has 3 children. The patient has never used tobacco products. The patient has never consumed significant amounts of alcohol. 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 PHYSICAL EXAMINATION:  VS: per nursing note  General: No acute distress. Eyes: Sclera anicteric. ENT: No oral lesions. Thyroid normal.  Nodes: No adenopathy. Skin: No spider angiomata. No jaundice. No palmar erythema. Respiratory: Lungs clear to auscultation. Cardiovascular: Regular heart rate. No murmurs. No JVD. Abdomen: Soft non-tender, liver size normal to percussion/palpation. Spleen not palpable. No obvious ascites. Extremities: No edema. No muscle wasting. No gross arthritic changes. Neurologic: Alert and oriented. Cranial nerves grossly intact. No asterixis. LABORATORY STUDIES:  From 8/2018  AST/ALT/ALP/T Bili/ALB:  46/61/87/1.2/4.6  WBC/HB/PLT/INR:  6.1/14.7/362    Additional lab values drawn at today's office visit are pending at the time of documentation. SEROLOGIES:  8/2018.   HAV total positive, HBsAntigen negative, anti-HBsurface positive, anti-HCV negative, Ferritin 64, iron saturation 28%, ASMA negative, ceruloplasmin 32.    LIVER HISTOLOGY:  9/2018. FibroScan performed at [facility]. EkPa was 6.1. IQR/med 3%. . The results suggested a fibrosis level of F0-1. The CAP score suggests fatty liver. 11/2019. FibroScan performed at [facility]. EkPa was 4.3. Suggested fibrosis level is F0-1. CAP score is 295, this is suggestive of steatosis. ENDOSCOPIC PROCEDURES:  Not available or performed    RADIOLOGY:  Not available. This reportedly showed steatosis. OTHER TESTING:  Not available or performed      All of the issues listed in the Assessment and Plan were discussed with the patient. All questions were answered. The patient expressed a clear understanding of the above. [address] [place] [address] in 12 months to monitor the impact of diet and weight loss and repeat Fibroscan.      Mihaela Galvan PA-C  Liver [place] [address], [address] [address]  22.  [phone]

## 2019-11-04 NOTE — PROGRESS NOTES
Chief Complaint   Patient presents with    Follow-up     fibroscan     Visit Vitals  /73 (BP 1 Location: Left arm, BP Patient Position: Sitting)   Pulse 71   Temp 98.6 °F (37 °C) (Tympanic)   Ht 5' 7\" (1.702 m)   Wt 176 lb 6.4 oz (80 kg)   SpO2 97%   BMI 27.63 kg/m²     3 most recent PHQ Screens 5/3/2019   Little interest or pleasure in doing things Not at all   Feeling down, depressed, irritable, or hopeless Not at all   Total Score PHQ 2 0     Abuse Screening Questionnaire 5/3/2019   Do you ever feel afraid of your partner? N   Are you in a relationship with someone who physically or mentally threatens you? N   Is it safe for you to go home? Y     1. Have you been to the ER, urgent care clinic since your last visit? Hospitalized since your last visit . Primary Care. Tonsilitis. 2. Have you seen or consulted any other health care providers outside of the [address] [place] [address] since your last visit? Include any pap smears or colon screening.  No

## 2019-11-05 LAB
ALBUMIN SERPL-MCNC: 4.7 G/DL (ref 3.5–5.5)
ALP SERPL-CCNC: 69 IU/L (ref 39–117)
ALT SERPL-CCNC: 12 IU/L (ref 0–32)
AST SERPL-CCNC: 17 IU/L (ref 0–40)
BILIRUB DIRECT SERPL-MCNC: 0.25 MG/DL (ref 0–0.4)
BILIRUB SERPL-MCNC: 1 MG/DL (ref 0–1.2)

## 2020-02-26 ENCOUNTER — OFFICE VISIT (OUTPATIENT)
Dept: FAMILY MEDICINE CLINIC | Age: 35
End: 2020-02-26

## 2020-02-26 VITALS
BODY MASS INDEX: 27.15 KG/M2 | RESPIRATION RATE: 18 BRPM | HEART RATE: 88 BPM | DIASTOLIC BLOOD PRESSURE: 85 MMHG | WEIGHT: 173 LBS | OXYGEN SATURATION: 98 % | HEIGHT: 67 IN | TEMPERATURE: 99.5 F | SYSTOLIC BLOOD PRESSURE: 120 MMHG

## 2020-02-26 DIAGNOSIS — R68.89 FLU-LIKE SYMPTOMS: Primary | ICD-10-CM

## 2020-02-26 DIAGNOSIS — R11.2 INTRACTABLE VOMITING WITH NAUSEA, UNSPECIFIED VOMITING TYPE: ICD-10-CM

## 2020-02-26 DIAGNOSIS — R53.83 FATIGUE, UNSPECIFIED TYPE: ICD-10-CM

## 2020-02-26 DIAGNOSIS — R51.9 ACUTE NONINTRACTABLE HEADACHE, UNSPECIFIED HEADACHE TYPE: ICD-10-CM

## 2020-02-26 DIAGNOSIS — R68.83 CHILLS: ICD-10-CM

## 2020-02-26 LAB
FLUAV+FLUBV AG NOSE QL IA.RAPID: NEGATIVE POS/NEG
FLUAV+FLUBV AG NOSE QL IA.RAPID: NEGATIVE POS/NEG
VALID INTERNAL CONTROL?: YES

## 2020-02-26 RX ORDER — SPIRONOLACTONE 100 MG/1
100 TABLET, FILM COATED ORAL
COMMUNITY
Start: 2020-01-13 | End: 2021-03-22 | Stop reason: ALTCHOICE

## 2020-02-26 RX ORDER — OSELTAMIVIR PHOSPHATE 75 MG/1
75 CAPSULE ORAL 2 TIMES DAILY
Qty: 10 CAP | Refills: 0 | Status: SHIPPED | OUTPATIENT
Start: 2020-02-26 | End: 2020-03-02

## 2020-02-26 RX ORDER — ONDANSETRON 8 MG/1
8 TABLET, ORALLY DISINTEGRATING ORAL
Qty: 12 TAB | Refills: 0 | Status: SHIPPED | OUTPATIENT
Start: 2020-02-26 | End: 2020-07-15

## 2020-02-26 NOTE — PROGRESS NOTES
HISTORY OF PRESENT ILLNESS  Db Sarah is a 27 y.o. female. HPI  Pt presents with \"cold symptoms\"    Symptoms have been present for 2 days  Nausea and vomiting  Headache  Cough  Nasal congestion  Body aches  Chills  Low grade fever  Review of Systems   Constitutional: Positive for chills, fever and malaise/fatigue. HENT: Positive for congestion. Respiratory: Positive for cough. Gastrointestinal: Positive for diarrhea and vomiting. Negative for abdominal pain. Physical Exam  Constitutional:       Appearance: Normal appearance. HENT:      Head: Normocephalic and atraumatic. Right Ear: Tympanic membrane normal.      Left Ear: Tympanic membrane normal.      Nose: Congestion present. Mouth/Throat:      Pharynx: No posterior oropharyngeal erythema. Cardiovascular:      Rate and Rhythm: Normal rate and regular rhythm. Heart sounds: Normal heart sounds. Pulmonary:      Effort: Pulmonary effort is normal.      Breath sounds: Normal breath sounds. Neurological:      Mental Status: She is alert. Psychiatric:         Mood and Affect: Mood normal.         Behavior: Behavior normal.         ASSESSMENT and PLAN    ICD-10-CM ICD-9-CM    1. Flu-like symptoms R68.89 780.99 oseltamivir (TAMIFLU) 75 mg capsule   2. Fatigue, unspecified type R53.83 780.79 AMB POC LAZARO INFLUENZA A/B TEST   3. Intractable vomiting with nausea, unspecified vomiting type R11.2 536.2 AMB POC LAZARO INFLUENZA A/B TEST      ondansetron (ZOFRAN ODT) 8 mg disintegrating tablet   4. Chills R68.83 780.64 AMB POC LAZARO INFLUENZA A/B TEST   5. Acute nonintractable headache, unspecified headache type R51 784.0 AMB POC LAZARO INFLUENZA A/B TEST     Educated about taking Tamiflu as prescribed, appears to be the flu  Should stay well hydrated, and treat fever as needed  Can take Zofran for nausea and vomiting    Pt informed to return to office with worsening of symptoms, or PRN with any questions or concerns.   Pt verbalizes understanding of plan of care and denies further questions or concerns at this time.

## 2020-02-26 NOTE — PROGRESS NOTES
Identified pt with two pt identifiers(name and ). Chief Complaint   Patient presents with    Fatigue    Vomiting    Chills    Generalized Body Aches        Health Maintenance Due   Topic    PAP AKA CERVICAL CYTOLOGY     Influenza Age 5 to Adult        Wt Readings from Last 3 Encounters:   20 173 lb (78.5 kg)   19 176 lb 6.4 oz (80 kg)   10/29/19 171 lb (77.6 kg)     Temp Readings from Last 3 Encounters:   20 99.5 °F (37.5 °C) (Oral)   19 98.6 °F (37 °C) (Tympanic)   10/29/19 98.4 °F (36.9 °C) (Oral)     BP Readings from Last 3 Encounters:   20 120/85   19 114/73   10/29/19 125/80     Pulse Readings from Last 3 Encounters:   20 88   19 71   10/29/19 81         Learning Assessment:  :     Learning Assessment 5/3/2019 2018 2018   PRIMARY LEARNER Patient Patient Patient   HIGHEST LEVEL OF EDUCATION - PRIMARY LEARNER  - SOME COLLEGE -   BARRIERS PRIMARY LEARNER NONE NONE NONE   CO-LEARNER CAREGIVER No - No   PRIMARY LANGUAGE ENGLISH ENGLISH ENGLISH   LEARNER PREFERENCE PRIMARY LISTENING VIDEOS DEMONSTRATION     - - LISTENING   ANSWERED BY patient patient patient   RELATIONSHIP SELF SELF SELF       Depression Screening:  :     3 most recent PHQ Screens 5/3/2019   Little interest or pleasure in doing things Not at all   Feeling down, depressed, irritable, or hopeless Not at all   Total Score PHQ 2 0       Fall Risk Assessment:  :     No flowsheet data found. Abuse Screening:  :     Abuse Screening Questionnaire 5/3/2019 2018   Do you ever feel afraid of your partner? N N   Are you in a relationship with someone who physically or mentally threatens you? N N   Is it safe for you to go home?  Y Y           Coordination of Care Questionnaire:  :     1) Have you been to an emergency room, urgent care clinic since your last visit? no   Hospitalized since your last visit? no             2) Have you seen or consulted any other health care providers outside of [address] [place] [address] since your last visit? yes  (Include any pap smears or colon screenings in this section.)    3) Do you have an Advance Directive on file? no  Are you interested in receiving information about Advance Directives? no    Reviewed record in preparation for visit and have obtained necessary documentation. Medication reconciliation up to date and corrected with patient at this time. Order for POC influenza Testing placed per [facility] verbal order.

## 2020-02-26 NOTE — PATIENT INSTRUCTIONS
Nausea and Vomiting: Care Instructions  Your Care Instructions    When you are nauseated, you may feel weak and sweaty and notice a lot of saliva in your mouth. Nausea often leads to vomiting. Most of the time you do not need to worry about nausea and vomiting, but they can be signs of other illnesses. Two common causes of nausea and vomiting are stomach flu and food poisoning. Nausea and vomiting from viral stomach flu will usually start to improve within 24 hours. Nausea and vomiting from food poisoning may last from 12 to 48 hours. The doctor has checked you carefully, but problems can develop later. If you notice any problems or new symptoms, get medical treatment right away. Follow-up care is a key part of your treatment and safety. Be sure to make and go to all appointments, and call your doctor if you are having problems. It's also a good idea to know your test results and keep a list of the medicines you take. How can you care for yourself at home? · To prevent dehydration, drink plenty of fluids, enough so that your urine is light yellow or clear like water. Choose water and other caffeine-free clear liquids until you feel better. If you have kidney, heart, or liver disease and have to limit fluids, talk with your doctor before you increase the amount of fluids you drink. · Rest in bed until you feel better. · When you are able to eat, try clear soups, mild foods, and liquids until all symptoms are gone for 12 to 48 hours. Other good choices include dry toast, crackers, cooked cereal, and gelatin dessert, such as Jell-O. When should you call for help? Call 911 anytime you think you may need emergency care. For example, call if:    · You passed out (lost consciousness).    Call your doctor now or seek immediate medical care if:    · You have symptoms of dehydration, such as:  ? Dry eyes and a dry mouth. ? Passing only a little dark urine. ?  Feeling thirstier than usual.     · You have new or worsening belly pain.     · You have a new or higher fever.     · You vomit blood or what looks like coffee grounds.    Watch closely for changes in your health, and be sure to contact your doctor if:    · You have ongoing nausea and vomiting.     · Your vomiting is getting worse.     · Your vomiting lasts longer than 2 days.     · You are not getting better as expected. Where can you learn more? Go to http://wes-mohamud.info/. Enter 25 [id number] in the search box to learn more about \"Nausea and Vomiting: Care Instructions. \"  Current as of: June 26, 2019  Content Version: 12.2  © 6446-0105 [employer]. Care instructions adapted under license by [employer] (which disclaims liability or warranty for this information). If you have questions about a medical condition or this instruction, always ask your healthcare professional. [address] any warranty or liability for your use of this information.

## 2020-02-26 NOTE — LETTER
NOTIFICATION RETURN TO WORK / SCHOOL 
 
2/26/2020 11:16 AM 
 
Ms. Aspen Crane John [address] [id number] [facility]  [id number] To Whom It May Concern: 
 
Javon Irwin is currently under the care of [address] [place] [address]. She needs to be excused from work from 2/26-2/28, due to illness If there are questions or concerns please have the patient contact our office. Sincerely, Jacqueline Biswas NP

## 2020-07-15 ENCOUNTER — HOSPITAL ENCOUNTER (EMERGENCY)
Age: 35
Discharge: HOME | End: 2020-07-15
Attending: EMERGENCY MEDICINE
Payer: COMMERCIAL

## 2020-07-15 VITALS
WEIGHT: 177.91 LBS | SYSTOLIC BLOOD PRESSURE: 129 MMHG | DIASTOLIC BLOOD PRESSURE: 88 MMHG | TEMPERATURE: 100 F | RESPIRATION RATE: 16 BRPM | BODY MASS INDEX: 27.92 KG/M2 | HEIGHT: 67 IN | OXYGEN SATURATION: 97 % | HEART RATE: 96 BPM

## 2020-07-15 DIAGNOSIS — L24.A9 WOUND DRAINAGE: Primary | ICD-10-CM

## 2020-07-15 PROCEDURE — 74011250637 HC RX REV CODE- 250/637: Performed by: EMERGENCY MEDICINE

## 2020-07-15 PROCEDURE — 99283 EMERGENCY DEPT VISIT LOW MDM: CPT

## 2020-07-15 RX ORDER — CLINDAMYCIN HYDROCHLORIDE 300 MG/1
300 CAPSULE ORAL 4 TIMES DAILY
Qty: 40 CAP | Refills: 0 | Status: SHIPPED | OUTPATIENT
Start: 2020-07-15 | End: 2021-01-19 | Stop reason: ALTCHOICE

## 2020-07-15 RX ORDER — CLINDAMYCIN HYDROCHLORIDE 150 MG/1
300 CAPSULE ORAL
Status: COMPLETED | OUTPATIENT
Start: 2020-07-15 | End: 2020-07-15

## 2020-07-15 RX ADMIN — CLINDAMYCIN HYDROCHLORIDE 300 MG: 150 CAPSULE ORAL at 20:05

## 2020-07-15 NOTE — DISCHARGE INSTRUCTIONS
Patient Education     Dental Surgery: What to Expect at [address] [place] [address] surgery includes procedures such as tooth extractions, root canals, gum surgery, and dental implants. Your procedure may be done by:  · A dentist.  · An oral surgeon. · An endodontist, for root canals. · A periodontist, for gum surgery. You may have some pain, bleeding, or swelling afterward, depending on the procedure. You may get medicine for pain. The pain should improve steadily after the surgery. This care sheet gives you a general idea about how long it will take for you to recover. But each person recovers at a different pace. Follow the steps below to get better as quickly as possible. How can you care for yourself at home? Activity  · Allow the area to heal. Don't move quickly or lift anything heavy until you are feeling better. · Rest when you feel tired. · Your dentist may give you specific instructions on when you can do your normal activities again, such as driving and going back to work. Diet  · Eat soft foods, such as gelatin, pudding, or a thin soup. Gradually add solid foods to your diet as you heal. You can eat solid foods again in about a week. · If you had a tooth pulled, don't use a straw for the first few days. Sucking on a straw can loosen the blood clot that forms at the surgery site. If this happens, it can delay healing. Medicines  · Your doctor will tell you if and when you can restart your medicines. He or she will also give you instructions about taking any new medicines. · If you take blood thinners, such as warfarin (Coumadin), clopidogrel (Plavix), or aspirin, be sure to talk to your doctor. He or she will tell you if and when to start taking those medicines again. Make sure that you understand exactly what your doctor wants you to do. · Be safe with medicines. Read and follow all instructions on the label.   ¨ If the dentist gave you a prescription medicine for pain, take it as prescribed. ¨ If you are not taking a prescription pain medicine, ask your dentist if you can take an over-the-counter medicine. · If your dentist prescribed antibiotics, take them as directed. Do not stop taking them just because you feel better. You need to take the full course of antibiotics. Incision care  · While your mouth is numb, be careful not to bite your tongue or the inside of your cheek or lip. · If you had a tooth pulled, bite gently on a gauze pad now and then. Change the pad as it becomes soaked with blood. Call your dentist or oral surgeon if you still have bleeding 24 hours after your surgery. · If you had stitches in your gums, your dentist will tell you if and when you need to come back to have them removed. · Starting 24 hours after your tooth was pulled, gently rinse your mouth with warm salt water several times a day to reduce swelling and relieve pain. · Continue to brush your teeth and tongue carefully. Floss when your dentist says you can. Ice and heat  · If needed, put ice or a cold pack on your cheek for 10 to 20 minutes at a time. Try to do this every 1 to 2 hours for the next 3 days (when you are awake) or until the swelling goes down. Put a thin cloth between the ice and your skin. Other instructions  · Do not smoke for at least 24 hours after your surgery. Smoking can delay healing. Smoking also decreases the blood supply and can bring germs and contaminants to the mouth. Follow-up care is a key part of your treatment and safety. Be sure to make and go to all appointments, and call your dentist if you are having problems. It's also a good idea to know your test results and keep a list of the medicines you take. When should you call for help? Call 911 anytime you think you may need emergency care. For example, call if:  · You passed out (lost consciousness). · You have severe trouble breathing.   Call your dentist now or seek immediate medical care if:  · You have pain that does not get better after you take pain medicine. · You have loose stitches, or your incision comes open. · You have new or more bleeding from the site. · You have signs of infection, such as:  ¨ Increased pain, swelling, warmth, or redness. ¨ Pus draining from the incision or socket. ¨ A fever. Watch closely for changes in your health, and be sure to contact your dentist if you have any problems. Where can you learn more? Go to Exhbit.be  Enter H289 in the search box to learn more about \"Dental Surgery: What to Expect at Home. \"   © 5306-9409 Healthwise, [employer]. Care instructions adapted under license by [facility] (which disclaims liability or warranty for this information). This care instruction is for use with your licensed healthcare professional. If you have questions about a medical condition or this instruction, always ask your healthcare professional. [address] [address] any warranty or liability for your use of this information.   Content Version: 99.0.110481; Current as of: November 20, 2015

## 2020-07-15 NOTE — ED PROVIDER NOTES
Please note that this dictation was completed with "[employer] the computer voice recognition software.  Quite often unanticipated grammatical, syntax, homophones, and other interpretive errors are inadvertently transcribed by the computer software.  Please disregard these errors.  Please excuse any errors that have escaped final proofreading. Had a dental extraction/ was on amoxicillin, last dose this am/ increased wound drainage/ foul tasting/ bad smell'  Recent dental extraction tooth 1; '(+) drainage  No visible/ palpable abscess    57-year-old white female past medical history markable for eosinophil esophagitis, neuropathy right leg, [place] and recent extraction tooth #1 by her dentist started on amoxicillin which she just finished this morning. Patient states that starting this morning she noticed she was starting to smell around her house and kept asking \"smells like something  in here. No one else can smell what she did she was looking around the house to figure out if something had  or was decomposing. Patient states that then she realized she was getting intermittent foul taste in her mouth with discharge she not sure where it was coming from but feels it is coming from a recently extracted tooth. She denies overt tooth pain overt face pain fevers chills vomiting diarrhea vision changes numbness tingling but states that taste and the odor are concerning to her. States she was compliant with amoxicillin took it all until she finished that this morning. She states she has an follow-up appointment with her dentist but will get in sooner if needed. We discussed the option such as lab work and imaging which she declined at this time would like to be started on a different antibiotic for potential wound drainage.     pt denies HA, vison changes, diff swallowing, CP, SOB, Abd pain, F/Ch, N/V, D/Cons or other current systemic complaints    Social/ PSH reviewed in EMR    EMR Chart Reviewed Past Medical History:   Diagnosis Date    Eosinophilic esophagitis 8/16/3694    HX OTHER MEDICAL     neuropathy of right leg    NAFL (nonalcoholic fatty liver)        Past Surgical History:   Procedure Laterality Date    HX OTHER SURGICAL      dnc, fibroid in uterus removed    HX OTHER SURGICAL      tubes in ears         Family History:   Problem Relation Age of Onset    Alcohol abuse Mother     Psychiatric Disorder Mother     No Known Problems Father         does not know father's family hx    Lupus Maternal Grandmother     Bipolar Disorder Daughter 6    Arthritis-rheumatoid Neg Hx     Asthma Neg Hx     Bleeding Prob Neg Hx     Cancer Neg Hx     Diabetes Neg Hx     Elevated Lipids Neg Hx     Headache Neg Hx     Heart Disease Neg Hx     Hypertension Neg Hx     Lung Disease Neg Hx     Migraines Neg Hx     Stroke Neg Hx     Mental Retardation Neg Hx        Social History     Socioeconomic History    Marital status:      Spouse name: Not on file    Number of children: Not on file    Years of education: Not on file    Highest education level: Not on file   Occupational History    Not on file   Social Needs    Financial resource strain: Not on file    Food insecurity     Worry: Not on file     Inability: Not on file    Transportation needs     Medical: Not on file     Non-medical: Not on file   Tobacco Use    Smoking status: Never Smoker    Smokeless tobacco: Never Used   Substance and Sexual Activity    Alcohol use: No    Drug use: No    Sexual activity: Yes     Partners: Male     Birth control/protection: None   Lifestyle    Physical activity     Days per week: Not on file     Minutes per session: Not on file    Stress: Not on file   Relationships    Social connections     Talks on phone: Not on file     Gets together: Not on file     Attends Sabianism service: Not on file     Active member of club or organization: Not on file     Attends meetings of clubs or organizations: Not on file     Relationship status: Not on file    Intimate partner violence     Fear of current or ex partner: Not on file     Emotionally abused: Not on file     Physically abused: Not on file     Forced sexual activity: Not on file   Other Topics Concern    Not on file   Social History Narrative    Not on file         ALLERGIES: Patient has no known allergies. Review of Systems   Constitutional: Negative for appetite change, chills and fever. HENT: Positive for dental problem. Negative for congestion, drooling, facial swelling, mouth sores, rhinorrhea, sore throat, trouble swallowing and voice change. Eyes: Negative for visual disturbance. Respiratory: Negative for cough and chest tightness. Gastrointestinal: Negative for abdominal pain, diarrhea, nausea and vomiting. Skin: Positive for wound. Negative for rash. All other systems reviewed and are negative. There were no vitals filed for this visit. Physical Exam  Vitals signs and nursing note reviewed. Constitutional:       General: She is not in acute distress. Appearance: Normal appearance. She is well-developed. She is not ill-appearing, toxic-appearing or diaphoretic. Comments: NAD, AxOx4, speaking in complete sentences    gcs = 15     HENT:      Head: Normocephalic and atraumatic. Comments: Cn intact         Right Ear: External ear normal.      Left Ear: External ear normal.      Nose: No congestion or rhinorrhea. Mouth/Throat:      Mouth: Mucous membranes are moist.      Pharynx: No oropharyngeal exudate or posterior oropharyngeal erythema. Comments: No dental abscess/ noted recently extracted spot tooth 1; no current drainage/ redness/ SOI    No  facial cellulitis/ redness/ swelling / ttp;   Eyes:      General: No scleral icterus. Right eye: No discharge. Extraocular Movements: Extraocular movements intact.       Conjunctiva/sclera: Conjunctivae normal.      Pupils: Pupils are equal, round, and reactive to light. Neck:      Musculoskeletal: Normal range of motion and neck supple. No neck rigidity. Vascular: No JVD. Trachea: No tracheal deviation. Cardiovascular:      Rate and Rhythm: Normal rate and regular rhythm. Pulses: Normal pulses. Heart sounds: Normal heart sounds. No murmur. No friction rub. No gallop. Pulmonary:      Effort: Pulmonary effort is normal. No respiratory distress. Breath sounds: Normal breath sounds. No stridor. No wheezing, rhonchi or rales. Chest:      Chest wall: No tenderness. Abdominal:      General: Bowel sounds are normal.      Palpations: Abdomen is soft. Tenderness: There is no abdominal tenderness. There is no guarding or rebound. Comments: nttp     Genitourinary:     Vagina: No vaginal discharge. Musculoskeletal: Normal range of motion. General: No swelling, tenderness, deformity or signs of injury. Right lower leg: No edema. Left lower leg: No edema. Skin:     General: Skin is warm and dry. Capillary Refill: Capillary refill takes less than 2 seconds. Coloration: Skin is not pale. Findings: No bruising, erythema or rash. Neurological:      General: No focal deficit present. Mental Status: She is alert and oriented to person, place, and time. Cranial Nerves: No cranial nerve deficit. Sensory: No sensory deficit. Motor: No weakness or abnormal muscle tone. Coordination: Coordination normal.      Gait: Gait normal.      Deep Tendon Reflexes: Reflexes normal.      Comments: pt has motor/ CV/ Sensation grossly intact to all extremities, R = L in strength;   Psychiatric:         Behavior: Behavior normal.         Thought Content: Thought content normal.          MDM       Procedures    7:35 PM Pt agrees w/ clinda rx/ 'will see my dentist';   Angy Lopez's  results have been reviewed with her. She has been counseled regarding her diagnosis. She verbally conveys understanding and agreement of the signs, symptoms, diagnosis, treatment and prognosis and additionally agrees to Call/ Arrange follow up as recommended with Dr. Romana Price NP in 24 - 48 hours. She also agrees with the care-plan and conveys that all of her questions have been answered. I have also put together some discharge instructions for her that include: 1) educational information regarding their diagnosis, 2) how to care for their diagnosis at home, as well a 3) list of reasons why they would want to return to the ED prior to their follow-up appointment, should their condition change or for concerns.

## 2020-07-15 NOTE — ED TRIAGE NOTES
Pt states that she had a molar removed last week which was infected. Pt has been taking amoxicillin post extraction. Pt states that today she is having pressure and a yellow drainage from area.

## 2020-07-16 NOTE — ED NOTES
Discharge note: The patient was discharged home in stable condition. The patient is alert and oriented, is in no respiratory distress and has vital signs within normal limits. The patient's diagnosis, condition and treatment were explained to patient by Dr Bhavna Boogie. The patient expressed understanding of discharge instructions, prescriptions, and plan of care. A discharge plan has been developed. A  was not involved in the process. Patient offered a wheelchair to ED lob for discharge but declined at this time. Patient ambulatory to ED lob to go home.

## 2021-01-19 ENCOUNTER — VIRTUAL VISIT (OUTPATIENT)
Dept: FAMILY MEDICINE CLINIC | Age: 36
End: 2021-01-19
Payer: COMMERCIAL

## 2021-01-19 DIAGNOSIS — F41.9 ANXIETY: Primary | ICD-10-CM

## 2021-01-19 PROCEDURE — 99213 OFFICE O/P EST LOW 20 MIN: CPT | Performed by: NURSE PRACTITIONER

## 2021-01-19 RX ORDER — DROSPIRENONE AND ETHINYL ESTRADIOL 0.02-3(28)
KIT ORAL DAILY
COMMUNITY

## 2021-01-19 RX ORDER — BUSPIRONE HYDROCHLORIDE 10 MG/1
10 TABLET ORAL 3 TIMES DAILY
Qty: 90 TAB | Refills: 1 | Status: SHIPPED | OUTPATIENT
Start: 2021-01-19 | End: 2021-03-24

## 2021-01-19 NOTE — PROGRESS NOTES
HISTORY OF PRESENT ILLNESS  Oriana Owens is a 38 y.o. female. HPI   Pt presents with \"anxiety\"  Visit was conducted via [employer]. me  Pt was located at home, provider was located at [facility]  Visit lasted 6 minutes  Oriana Owens, who was evaluated through a synchronous (real-time) audio-video encounter, and/or her healthcare decision maker, is aware that it is a billable service, with coverage as determined by her insurance carrier. She provided verbal consent to proceed: Yes, and patient identification was verified. It was conducted pursuant to the emergency declaration under the [facility], [address] [place] [address] authority and the [employer] General Act. A caregiver was present when appropriate. Ability to conduct physical exam was limited. I was in the office. The patient was at home. Pt states that she has started to have panic attacks  This started months ago when her boy friend and she got into an argument, but they have continued  When she has them, she will feel like she can't catch her breath, and then will start vomiting  It will take about 10-15 minutes to get her breathing under control  She will then feel tense for the entire next day  She notes that this has been happening more frequently, and she now feels tense more often then not  She denies any depression symptoms  She notes that she has a lot of stress with home life, is working full time and is in school full time    She has no interest in therapy/counseling  She would not like to be on an anti-depressant, and is interested in something she can take as needed for her anxiety  Review of Systems   Constitutional: Negative for fever. Physical Exam  Constitutional:       Appearance: Normal appearance. Neurological:      Mental Status: She is alert and oriented to person, place, and time.          ASSESSMENT and PLAN    ICD-10-CM ICD-9-CM 1. Anxiety  F41.9 300.00 busPIRone (BUSPAR) 10 mg tablet     Educated about taking Buspar as prescribed  Should notify office should symptoms continue and/or worsen, and may need to start SSRI, etc, at that time  Should always go to ER with any suicidal and/or homicidal ideations    Pt informed to return to office with worsening of symptoms, or PRN with any questions or concerns. Pt verbalizes understanding of plan of care and denies further questions or concerns at this time.

## 2021-03-17 ENCOUNTER — VIRTUAL VISIT (OUTPATIENT)
Dept: FAMILY MEDICINE CLINIC | Age: 36
End: 2021-03-17
Payer: COMMERCIAL

## 2021-03-17 DIAGNOSIS — R13.10 DYSPHAGIA, UNSPECIFIED TYPE: ICD-10-CM

## 2021-03-17 DIAGNOSIS — R53.83 FATIGUE, UNSPECIFIED TYPE: Primary | ICD-10-CM

## 2021-03-17 DIAGNOSIS — N32.89 BLADDER SPASM: ICD-10-CM

## 2021-03-17 PROCEDURE — 99213 OFFICE O/P EST LOW 20 MIN: CPT | Performed by: NURSE PRACTITIONER

## 2021-03-17 NOTE — PROGRESS NOTES
HISTORY OF PRESENT ILLNESS  Irais Acevedo is a 38 y.o. female. HPI  Pt presents with \"discuss FMLA\"  Visit was conducted via [employer]. me  Pt was located at home, provider was located at [facility]  Visit lasted 12 minutes  Irais Acevedo, who was evaluated through a synchronous (real-time) audio-video encounter, and/or her healthcare decision maker, is aware that it is a billable service, with coverage as determined by her insurance carrier. She provided verbal consent to proceed: Yes, and patient identification was verified. This visit was conducted pursuant to the emergency declaration under the [facility][note 3][facility], [address] [place] [address] authority and the [employer] and [employer] General Act. A caregiver was present when appropriate. Ability to conduct physical exam was limited. The patient was located in a state where the provider was credentialed to provide care. --Kathy Whelan, NP on 3/17/2021 at 10:14 AM      Pt states that since June 2020, she has been in and out of the ER for various things. Her work has not been accommodating, and is not allowing her to use her time off that she has accrued with working there for 5 years. She is being followed by GI for eosinophilic esophagitis, had recent endoscopy for this. She states that she needs to have a couple of appointments with GI in the near future. In addition, she had bladder spasms last June, for which they want her to be seen by rheumatology. She has history of neuropathy, in her right leg, and they are wondering if this correlates with the bladder spasms, possibly caused by autoimmune issue? She feels very fatigued and run down with everything going on. She is interested in taking FMLA for 3 weeks, to get in with these specialists appointments.     She is still dealing with fatigue, choking on food, decreased appetite, right leg neuropathy, bladder spasms at times.  Review of Systems   Constitutional: Positive for malaise/fatigue. Negative for fever. Physical Exam  Constitutional:       Appearance: Normal appearance. Neurological:      Mental Status: She is alert. Psychiatric:         Behavior: Behavior normal.         ASSESSMENT and PLAN    Educated about getting work to send paper work to office for review  We will call when this returns    Pt informed to return to office with worsening of symptoms, or PRN with any questions or concerns. Pt verbalizes understanding of plan of care and denies further questions or concerns at this time.

## 2021-03-19 ENCOUNTER — TELEPHONE (OUTPATIENT)
Dept: FAMILY MEDICINE CLINIC | Age: 36
End: 2021-03-19

## 2021-03-19 ENCOUNTER — APPOINTMENT (OUTPATIENT)
Dept: FAMILY MEDICINE CLINIC | Age: 36
End: 2021-03-19

## 2021-03-19 ENCOUNTER — OFFICE VISIT (OUTPATIENT)
Dept: RHEUMATOLOGY | Age: 36
End: 2021-03-19

## 2021-03-19 VITALS
HEART RATE: 94 BPM | TEMPERATURE: 97.1 F | HEIGHT: 67 IN | SYSTOLIC BLOOD PRESSURE: 139 MMHG | WEIGHT: 193 LBS | BODY MASS INDEX: 30.29 KG/M2 | DIASTOLIC BLOOD PRESSURE: 94 MMHG | OXYGEN SATURATION: 97 %

## 2021-03-19 DIAGNOSIS — G62.9 NEUROPATHY: ICD-10-CM

## 2021-03-19 DIAGNOSIS — R53.82 CHRONIC FATIGUE: Primary | ICD-10-CM

## 2021-03-19 DIAGNOSIS — M25.59 PAIN IN OTHER JOINT: ICD-10-CM

## 2021-03-19 DIAGNOSIS — R53.82 CHRONIC FATIGUE: ICD-10-CM

## 2021-03-19 PROCEDURE — 99244 OFF/OP CNSLTJ NEW/EST MOD 40: CPT | Performed by: PEDIATRICS

## 2021-03-19 RX ORDER — OMEPRAZOLE 20 MG/1
CAPSULE, DELAYED RELEASE ORAL
COMMUNITY
Start: 2021-03-05

## 2021-03-19 NOTE — TELEPHONE ENCOUNTER
Pt would like it to start effective Monday, 03/22/21. She is aware that you will not return to the office until Monday and you can complete the paperwork at that time.

## 2021-03-19 NOTE — PROGRESS NOTES
No diagnosis of Auto immune    Visit Vitals  BP (!) 139/94 (BP 1 Location: Right upper arm, BP Patient Position: Sitting)   Pulse 94   Temp 97.1 °F (36.2 °C) (Temporal)   Ht 5' 7\" (1.702 m)   Wt 193 lb (87.5 kg)   SpO2 97%   BMI 30.23 kg/m²

## 2021-03-19 NOTE — PROGRESS NOTES
CHIEF COMPLAINT  The patient was sent for rheumatology consultation for evaluation of joint pain - Maci Riley NP      HISTORY OF PRESENT ILLNESS  This is a 38 y.o.  female. Today, the patient complains of pain in the joints. Location: elbow  Severity: 4 on a scale of 0-10  Timing:  All day   Duration: several years     Modifying factors:   Context/Associated signs and symptoms: The patient's chief complaint is episodic fatigue for several years that has significantly worsened over the past month. She states it is now difficult to make it through the day without a nap. Patient complains of body aches specifically over her hands and hips that is always present in varying severity. She reports a clicking discomfort over her right CMC, pain over her right knee and bilateral hips. She states discomfort is not exacerbated or improved with activity. She reports pain and redness over her PIPs. She states joint pain has had some improvement with use of topical Diclofenac. Endorses sensation of a fever with facial flushing, elevated pulse rate, chills, and elevated blood pressures, but no actual fever. Denies persistent joint swelling, morning stiffness, back pain, chest pain, exercise intolerance, oral sores, dry eyes, dry mouth, and color changes of her hands related to Raynaud's. Shoulders remain unaffected. Labs reviewed included normal TSH in 2018 and MAYLIN panel. Of note, she has eosinophilic esophagitis followed by GI. The patient reports a 20 year history of transient and episodic numbness over her right lower extremity with foot drop. She states she also has intermittent numbness of her left 5th digit when she holds objects like her phone. She mentions an instance of breaking the bones across her mid-foot without realizing it due to lack of sensation. She reports nerve pain over her right lower extremity, especially her right foot, is characterized as a burning sensation.  This was previously evaluated by neurology in 2012 with no definitive diagnosis. EMG from 2012 reviewed indicated very mild right sural mononeuropathy. Last chest x-ray 3 weeks ago was unremarkable, results unavailable at this time. She has a history of bladder spasms with difficulty urinating causing backflow into her kidneys. Noted that this issue has resolved. RHEUMATOLOGY REVIEW OF SYSTEMS   Positives as per HPI  Negatives as follows:  Lara Mccrarying:  Denies unexplained persistent fevers, weight change  HEAD/EYES:   Denies eye redness, blurry vision or sudden loss of vision, dry eyes, HA, temporal artery pain  ENT:    Denies oral/nasal ulcers, recurrent sinus infections, dry mouth  RESPIRATORY:  No pleuritic pain, history of pleural effusions, hemoptysis, exertional dyspnea  CARDIOVASCULAR:  Denies chest pain, history of pericardial effusions  GASTRO:   Denies heartburn, esophageal dysmotility, abdominal pain, nausea, vomiting, diarrhea, blood in the stool  HEMATOLOGIC:  No easy bruising, purpura, swollen lymph nodes  SKIN:    Denies alopecia, ulcers, nodules, sun sensitivity, unexplained persistent rash   VASCULAR:   Denies edema, cyanosis, raynaud phenomenon  NEUROLOGIC:  Denies specific muscle weakness  PSYCHIATRIC:  No sleep disturbance / snoring, depression, anxiety  MSK:    No morning stiffness >1 hour, SI joint pain, persistent joint swelling    MEDICAL AND SOCIAL HISTORY  This was reviewed with the patient and reviewed in the medical records.       Past Medical History:   Diagnosis Date    Eosinophilic esophagitis 7/07/2347    HX OTHER MEDICAL     neuropathy of right leg    NAFL (nonalcoholic fatty liver)      Past Surgical History:   Procedure Laterality Date    HX ENDOSCOPY  73/94/4340    Eosinophilic espohogitis    HX OTHER SURGICAL      dnc, fibroid in uterus removed    HX OTHER SURGICAL      tubes in ears     Social History     Tobacco Use    Smoking status: Never Smoker    Smokeless tobacco: Never Used Substance Use Topics    Alcohol use: No    Drug use: No     Employment -   Sleep - Good, no issues  Exercise - no    FAMILY HISTORY  No autoimmune disease in 1st degree relatives   celiac disease - daughter      MEDICATIONS  All the current medications were reviewed in detail. PHYSICAL EXAM  Blood pressure (!) 139/94, pulse 94, temperature 97.1 °F (36.2 °C), temperature source Temporal, height 5' 7\" (1.702 m), weight 193 lb (87.5 kg), SpO2 97 %. GENERAL APPEARANCE: Well-nourished adult in no acute distress. EYES: No scleral erythema, conjunctival injection. ENT: No oral ulcer, parotid enlargement. NECK: No adenopathy, thyroid enlargement. CARDIOVASCULAR: Heart rhythm is regular. No murmur, rub, gallop. CHEST: Normal vesicular breath sounds. No wheezes, rales, pleural friction rubs. ABDOMINAL: The abdomen is soft and nontender. Liver and spleen are nonpalpable. Bowel sounds are normal.  EXTREMITIES: There is no evidence of clubbing, cyanosis, edema. SKIN: No rash, palpable purpura, digital ulcer, abnormal thickening. NEUROLOGICAL: Normal gait and station, full strength in upper and lower extremities, normal sensation to light touch. MUSCULOSKELETAL:   Upper extremities - full range of motion, no tenderness, no swelling, no synovial thickening and no deformity of joints except stiffness in right hand with no synovitis  Lower extremities - full range of motion, no tenderness, no swelling, no synovial thickening and no deformity of joints. LABS, RADIOLOGY AND PROCEDURES  Previous labs reviewed -Yes  Previous radiology reviewed -Yes  Previous procedures reviewed -Yes  Previous medical records reviewed/summarized -Yes    ASSESSMENT  1. Fatigue, Nerve pain over RLE over past 20 years - Based on history and exam, I do not suspect the patient has an underlying autoimmune disease causing her symptoms. There is no active synovitis on exam. I will order a sleep study to evaluate her fatigue. I will order labs for further evaluation. I will order an EMG of her right lower extremity to evaluate her numbness. Based on these results I will consider referring patient to neurology for a second opinion. PLAN  1. Check TSH, CH50, quantitative immunoglobulins, RF   2. Autoantibody evaluation to rule out Sjogren's, SLE, MCTD, vasculitis - FRANKLIN with IF, ANCA, complements, SSA/SSB, DSDNA, SM/RNP  3. Sleep Study   4. Repeat EMG of RLE      Jorgedeyanira MCCLAIN. Dora Cintron MD  Adult and Pediatric Rheumatology     [facility], [address] [place] [address], Phone [phone], Fax [phone]     Visiting  of Pediatrics    Department of Pediatrics, [facility] of [address] [place] [address], [address] [place] [address], Phone [phone], Fax [phone]    There are no Patient Instructions on file for this visit. cc:  TYRELL Smith Dr., Dr. - Urology   Dr. Debbie Whaley - Gastroenterology     Written by juna jose Allen, as dictated by Cam Leon.  Dora Cintron M.D.

## 2021-03-19 NOTE — TELEPHONE ENCOUNTER
Please call patient and let her know that we received her work paperwork. What date would she like her 3 week leave from work to start?   Once I know this, I will complete the forms and fax back  Thanks

## 2021-03-20 LAB
IGA SERPL-MCNC: 174 MG/DL (ref 70–400)
IGG SERPL-MCNC: 1140 MG/DL (ref 700–1600)
IGM SERPL-MCNC: 126 MG/DL (ref 40–230)
T4 FREE SERPL-MCNC: 0.8 NG/DL (ref 0.8–1.5)
TSH SERPL DL<=0.05 MIU/L-ACNC: 1.4 UIU/ML (ref 0.36–3.74)

## 2021-03-21 LAB
C3 SERPL-MCNC: 151 MG/DL (ref 82–167)
C4 SERPL-MCNC: 29 MG/DL (ref 12–38)

## 2021-03-22 LAB
ENA RNP AB SER-ACNC: <0.2 AI (ref 0–0.9)
ENA SM AB SER-ACNC: <0.2 AI (ref 0–0.9)
ENA SS-A AB SER-ACNC: <0.2 AI (ref 0–0.9)
ENA SS-B AB SER-ACNC: <0.2 AI (ref 0–0.9)

## 2021-03-22 NOTE — TELEPHONE ENCOUNTER
I have completed forms and placed on your desk. If you would please fax them for me, I would appreciate it. Please let patient know that this has been completed. Thanks!

## 2021-03-22 NOTE — TELEPHONE ENCOUNTER
Have attempted to fax twice since 8:52 and receive \"fax failed/no answer\" now twice. Will try again later.

## 2021-03-23 LAB
CH50 SERPL-ACNC: >60 U/ML
HISTONE IGG SER IA-ACNC: 0.4 UNITS (ref 0–0.9)
RHEUMATOID FACT SERPL-ACNC: <15 IU/ML (ref 0–15)

## 2021-03-24 DIAGNOSIS — F41.9 ANXIETY: ICD-10-CM

## 2021-03-24 LAB — CENTROMERE AB: NORMAL

## 2021-03-24 RX ORDER — BUSPIRONE HYDROCHLORIDE 10 MG/1
TABLET ORAL
Qty: 90 TAB | Refills: 1 | Status: SHIPPED | OUTPATIENT
Start: 2021-03-24 | End: 2022-09-19 | Stop reason: ALTCHOICE

## 2021-04-06 ENCOUNTER — PATIENT MESSAGE (OUTPATIENT)
Dept: SLEEP MEDICINE | Age: 36
End: 2021-04-06

## 2021-04-06 NOTE — TELEPHONE ENCOUNTER
Dear Ms Ravi Pino tried calling you but missed you. I tried to send new patient paperwork by email ([email or web address]) but it bounced back. I will send it by mail. Please verify email address. Thank you.     Juan Valentine

## 2021-04-09 ENCOUNTER — TELEPHONE (OUTPATIENT)
Dept: FAMILY MEDICINE CLINIC | Age: 36
End: 2021-04-09

## 2021-04-09 NOTE — TELEPHONE ENCOUNTER
Good Morning! Return to work letter was faxed to: Alda Tolentino, ,  fax - [phone]. Pt was advised via [employer].

## 2021-04-09 NOTE — TELEPHONE ENCOUNTER
Will you please fax letter that was written today, to the fax number in [phone] message from today?   Thanks

## 2021-04-26 ENCOUNTER — VIRTUAL VISIT (OUTPATIENT)
Dept: SLEEP MEDICINE | Age: 36
End: 2021-04-26
Payer: COMMERCIAL

## 2021-04-26 VITALS — HEIGHT: 67 IN | BODY MASS INDEX: 30.29 KG/M2 | WEIGHT: 193 LBS

## 2021-04-26 DIAGNOSIS — G47.419 NARCOLEPSY WITHOUT CATAPLEXY: Primary | ICD-10-CM

## 2021-04-26 DIAGNOSIS — Z86.59 H/O ANXIETY DISORDER: ICD-10-CM

## 2021-04-26 PROCEDURE — 99204 OFFICE O/P NEW MOD 45 MIN: CPT | Performed by: INTERNAL MEDICINE

## 2021-04-26 NOTE — PROGRESS NOTES
[address] [facility] [place], [address]  Tel.  [phone]  Fax. 100 [facility] 60  [facility], [address] [facility]  Tel.  [phone]  Fax. [phone] [address]  Tel.  [phone]  Fax. [phone] [facility] is a 38y.o. year old female referred by Dr. Onel Ramachandran for evaluation of a sleep disorder. ASSESSMENT/PLAN:      ICD-10-CM ICD-9-CM    1. Narcolepsy without cataplexy  G47.419 347.00 POLYSOMNOGRAPHY 1 NIGHT      MULTIPLE SLEEP LATENCY TEST      DRUG ABUSE PROF, URINE (SEVEN DRUGS), MS COFIRM      DRUG ABUSE PROF, URINE (SEVEN DRUGS), MS COFIRM   2. H/O anxiety disorder  Z86.59 V11.8    3. BMI 30.0-30.9,adult  Z68.30 V85.30        Patient has a history and examination not consistent with the diagnosis of sleep apnea. Follow-up and Dispositions    · Return for telephone follow-up after testing is completed. * The patient currently has a Minimal risk for having sleep apnea. STOP-BANG score 1.    * Sleep testing was ordered for initial evaluation of narcolepsy which may present with poor sleep quality / insomnia along with daytime tiredness and fatigue. Orders Placed This Encounter    MULTIPLE SLEEP LATENCY TEST     Standing Status:   Future     Standing Expiration Date:   4/26/2022    DRUG ABUSE PROF, URINE (SEVEN DRUGS), MS COFIRM     Standing Status:   Future     Number of Occurrences:   1     Standing Expiration Date:   10/26/2021    POLYSOMNOGRAPHY 1 NIGHT     Order Specific Question:   Reason for Exam     Answer:   Narcolepsy     * Counseling was provided regarding proper sleep hygiene to include but not limited to effect of multi-media interaction in sleep environment and of the need to use the bed only for sleeping. * Counseling was also provided regarding age appropriate sleep needs and sleep environment safety.   Components of CBT-I,  namely paradoxical intention and stimulus control therapy were reviewed. * The patient was counseled regarding safe driving and the benefits of exercise and weight loss. * All of her questions were addressed. 2. Recommended a dedicated weight loss program through appropriate diet and exercise regimen as significant weight reduction has been shown to reduce severity of obstructive sleep apnea. SUBJECTIVE/OBJECTIVE:    Enrique Wellington is an 38 y.o. female referred for evaluation for a sleep disorder. She complains of poor quality sleep associated with waking up feeling tired and sleepy in the day. Symptoms began several years ago, unchanged since that time. She usually can fall asleep in 30 to 60 minutes. Family or house members do not note snoring, snorting, choking, periods of not breathing. She denies of symptoms indicative of cataplexy, sleep paralysis or sleep related hallucinations. She denies of a history of unusual movements occurring during sleep. Enrique Wellington does wake up frequently at night. She   bothered by waking up too early and left unable to get back to sleep. She actually sleeps about 3 hours at night and wakes up about 4-6 times during the night. She   work shifts: Darya Gandhi indicates she does get too little sleep at night. Her bedtime is 10:00 pm. She awakens at 6:30 am. She does not take naps. She has the following observed behaviors:  ;  .  Other remarks:  She has been on amytryptyline for sleep issue and migrane but left her exhausted the following morning due to possible delayed clearance secondary fatty liver disease. The patient has not undergone diagnostic testing for the current problems.      Review of Systems:  Constitutional:  No significant weight loss or weight gain  Eyes:  No blurred vision  CVS:  No significant chest pain  Pulm:  No significant shortness of breath  GI:  No significant nausea or vomiting  :  No significant nocturia  Musculoskeletal:  No significant joint pain at night  Skin:  No significant rashes  Neuro:  No significant dizziness   Psych:  No active mood issues    Sleep Review of Systems: notable for Positive difficulty falling asleep; Positive awakenings at night; Negative perceived regular dreaming; Positive nightmares; Positive  early morning headaches attributed to allergies; Positive  memory problems; Positive  concentration issues; Positive caffeine;  Negative alcohol;   Negative history of any automobile or occupational accidents due to daytime drowsiness. Hartline Sleepiness Score: 10   and Modified F.O.S.Q. Score Total / 2: 10.5    Patient-Reported Vitals 1/18/2021   Patient-Reported Weight 183   Patient-Reported Height 5'7\"   Patient-Reported Pulse 90   Patient-Reported Temperature 98.4   Patient-Reported SpO2 95   Patient-Reported LMP 01/13/2021       Physical Exam completed by visual and auditory observation of patient with verbal input from patient. General:   Alert, oriented, not in acute distress   Eyes:  Anicteric Sclerae; no obvious strabismus   Nose:  No obvious nasal septum deviation    Neck:   Midline trachea, no visible mass   Chest/Lungs:  Respiratory effort normal, no visualized signs of difficulty breathing or respiratory distress   CVS:  No JVD   Extremities:  No obvious rashes noted on face, neck, or hands   Neuro:  No facial asymmetry, no focal deficits; no obvious tremor    Psych:  Normal affect,  normal countenance     Edwin Lopez is being evaluated by a Virtual Visit (video visit) encounter to address concerns as mentioned above. A caregiver was present when appropriate. Due to this being a TeleHealth encounter (During RUSTXL-36 public health emergency), evaluation of the following organ systems was limited: Vitals/Constitutional/EENT/Resp/CV/GI//MS/Neuro/Skin/Heme-Lymph-Imm.   Pursuant to the emergency declaration under the [facility], [address]5 waiver authority and the [employer] Act, this Virtual Visit was conducted with patient's (and/or legal guardian's) consent, to reduce the patient's risk of exposure to COVID-19 and provide necessary medical care. Patient identification was verified at the start of the visit: YES using name and date of birth. Patient's phone number [phone] (home)  was confirmed for accuracy. She gives permission for messages regarding results and appointments to be left at that number. Services were provided through a video synchronous discussion virtually to substitute for in-person clinic visit. I was at home while conducting this encounter, patient located at their home or alternate location of their choice. On this date 04/26/2021 I have spent 45 minutes reviewing previous notes, test results and face to face with the patient discussing the diagnosis and importance of compliance with the treatment plan as well as documenting on the day of the visit. An electronic signature was used to authenticate this note. Elisabeth Dunn MD, FAASM  Diplomate American Board of Sleep Medicine  Diplomate in Sleep Medicine - ABP    Electronically signed.  04/26/21

## 2021-04-26 NOTE — PATIENT INSTRUCTIONS
[address] [facility] [place]. 1668 [facility], [address] Tel.  [phone] Fax. 100 [facility] 60 [place], [address] Tel.  [phone] Fax. [phone] [address] [address] Tel.  [phone] Fax. [phone] Narcolepsy: After Your Visit Your Care Instructions Everybody gets a little sleepy once in a while, during a long car ride or other times when you want to be alert. But some people cannot control their sleepiness. It is no fun to be in the middle of your workday or driving your car down the street and have an overwhelming desire to sleep. This condition is called narcolepsy. Doctors do not know what causes narcolepsy. Your doctor may ask you to keep a sleep diary for a couple of weeks. It will help you and your doctor decide on treatment. It often helps to take limited naps during the day. It also helps to create a good mood and place for nighttime sleep. Your doctor may recommend medicine to help you stay awake during the day or sleep at night. Follow-up care is a key part of your treatment and safety. Be sure to make and go to all appointments, and call your doctor if you are having problems. It's also a good idea to know your test results and keep a list of the medicines you take. How can you care for yourself at home? · Try to take 2 or 3 short naps at regular times during the day. After a nap, always give yourself time to become alert before you drive a car or do anything that might cause an accident. · Take your medicines exactly as prescribed. Call your doctor if you think you are having a problem with your medicine. You may need to try several medicines before you find the one that works best for you. · Try to improve your nighttime sleep habits. Here are a few of the things you could do: ¨ Go to bed only when you are sleepy, and get up at the same time every day, even if you do not feel rested.  This might help you sleep well the next night and the night after that. ¨ If you lie awake for longer than 15 minutes, get up, leave the bedroom, and do something quiet, such as read, until you feel sleepy again. ¨ Avoid drinking or eating anything with caffeine after 3 p.m. This includes coffee, tea, cola drinks, and chocolate. ¨ Make sure your bedroom is not too hot or too cold, and keep it quiet and dark. ¨ Make sure your mattress provides good support. · Be kind to your body: ¨ Relieve tension with exercise or a massage. ¨ Learn and do relaxation techniques. ¨ Avoid alcohol, caffeine, nicotine, and illegal drugs. They can increase your anxiety level and cause sleep problems. · Get light exercise daily. Gentle stretching, light aerobics, swimming, walking, and riding a bicycle can help to keep you going during the day and to sleep well at night. · Eat a healthy diet. You may feel better if you avoid heavy meals and eat more fruits and vegetables. · Do not use over-the-counter sleeping pills. They can make your sleep restless. · Ask your doctor if any medicines you take could cause sleepiness. For example, cold and allergy medicines can make you drowsy. · Consider joining a support group with people who have narcolepsy or other sleep problems. These groups can be a good source of tips for what to do. Also, it can be comforting to talk to people who face similar challenges. Your doctor can tell you how to contact a support group. When should you call for help? Call your doctor now or seek immediate medical care if: 
· You passed out (lost consciousness). · You cannot use your muscles. This may happen very briefly, sometimes after you laugh or are angry, and may only affect part of your body. Watch closely for changes in your health, and be sure to contact your doctor if: 
· Your sleepiness continues to get worse. Where can you learn more? Go to Prescient.be Enter O378 in the search box to learn more about \"Narcolepsy: After Your Visit. \"  
© 4644-1153 Healthwise, Incorporated. Care instructions adapted under license by [facility] (which disclaims liability or warranty for this information). This care instruction is for use with your licensed healthcare professional. If you have questions about a medical condition or this instruction, always ask your healthcare professional. [address] any warranty or liability for your use of this information. Content Version: 6.6.72206; Last Revised: September 15, 2009 PROPER SLEEP HYGIENE What to avoid · Do not have drinks with caffeine, such as coffee or black tea, for 8 hours before bed. · Do not smoke or use other types of tobacco near bedtime. Nicotine is a stimulant and can keep you awake. · Avoid drinking alcohol late in the evening, because it can cause you to wake in the middle of the night. · Do not eat a big meal close to bedtime. If you are hungry, eat a light snack. · Do not drink a lot of water close to bedtime, because the need to urinate may wake you up during the night. · Do not read or watch TV in bed. Use the bed only for sleeping and sexual activity. What to try · Go to bed at the same time every night, and wake up at the same time every morning. Do not take naps during the day. · Keep your bedroom quiet, dark, and cool. · Get regular exercise, but not within 3 to 4 hours of your bedtime. Maggie Rowland · Sleep on a comfortable pillow and mattress. · If watching the clock makes you anxious, turn it facing away from you so you cannot see the time. · If you worry when you lie down, start a worry book. Well before bedtime, write down your worries, and then set the book and your concerns aside. · Try meditation or other relaxation techniques before you go to bed. · If you cannot fall asleep, get up and go to another room until you feel sleepy. Do something relaxing.  Repeat your bedtime routine before you go to bed again. 
· Make your house quiet and calm about an hour before bedtime. Turn down the lights, turn off the TV, log off the computer, and turn down the volume on music. This can help you relax after a busy day. Drowsy Driving: The [facility] cites drowsiness as a causing factor in more than 754,543 police reported crashes annually, resulting in 76,000 injuries and 1,500 deaths. Other surveys suggest 55% of people polled have driven while drowsy in the past year, 23% had fallen asleep but not crashed, 3% crashed, and 2% had and accident due to drowsy driving. Who is at risk? Young Drivers: One study of drowsy driving accidents states that 55% of the drivers were under 25 years. Of those, 75% were male. Shift Workers and Travelers: People who work overnight or travel across time zones frequently are at higher risk of experiencing Circadian Rhythm Disorders. They are trying to work and function when their body is programed to sleep. Sleep Deprived: Lack of sleep has a serious impact on your ability to pay attention or focus on a task. Consistently getting less than the average of 8 hours your body needs creates partial or cumulative sleep deprivation. Untreated Sleep Disorders: Sleep Apnea, Narcolepsy, R.L.S., and other sleep disorders (untreated) prevent a person from getting enough restful sleep. This leads to excessive daytime sleepiness and increases the risk for drowsy driving accidents by up to 7 times. Medications / Alcohol: Even over the counter medications can cause drowsiness. Medications that impair a drivers attention should have a warning label. Alcohol naturally makes you sleepy and on its own can cause accidents. Combined with excessive drowsiness its effects are amplified. Signs of Drowsy Driving: * You don't remember driving the last few miles * You may drift out of your gurpreet * You are unable to focus and your thoughts wander  * You may yawn more often than normal 
 * You have difficulty keeping your eyes open / nodding off * Missing traffic signs, speeding, or tailgating Prevention-  
Good sleep hygiene, lifestyle and behavioral choices have the most impact on drowsy driving. There is no substitute for sleep and the average person requires 8 hours nightly. If you find yourself driving drowsy, stop and sleep. Consider the sleep hygiene tips provided during your visit as well. Medication Refill Policy: Refills for all medications require 1 week advance notice. Please have your pharmacy fax a refill request. We are unable to fax, or call in \"controled substance\" medications and you will need to pick these prescriptions up from our office. [employer] Activation Thank you for requesting access to [employer]. Please follow the instructions below to securely access and download your online medical record. [employer] allows you to send messages to your doctor, view your test results, renew your prescriptions, schedule appointments, and more. How Do I Sign Up? 1. In your internet browser, go to [email or web address]. 2. Click on the First Time User? Click Here link in the Sign In box. You will see the New Member Sign Up page. 3. Enter your Feed[employer] Access Code exactly as it appears below. You will not need to use this code after youve completed the sign-up process. If you do not sign up before the expiration date, you must request a new code. [employer] Access Code: Activation code not generated Current Feedsky Status: Active (This is the date your Feedsky access code will ) 4. Enter the last four digits of your Social Security Number (xxxx) and Date of Birth (mm/dd/yyyy) as indicated and click Submit. You will be taken to the next sign-up page. 5. Create a Feedsky ID. This will be your Feedsky login ID and cannot be changed, so think of one that is secure and easy to remember. 6. Create a [employer] password.  You can change your password at any time. 7. Enter your Password Reset Question and Answer. This can be used at a later time if you forget your password. 8. Enter your e-mail address. You will receive e-mail notification when new information is available in [address] 9. Click Sign Up. You can now view and download portions of your medical record. 10. Click the Download Summary menu link to download a portable copy of your medical information. Additional Information If you have questions, please call [phone]. Remember, [employer] is NOT to be used for urgent needs. For medical emergencies, dial 911.

## 2021-04-30 ENCOUNTER — HOSPITAL ENCOUNTER (OUTPATIENT)
Dept: PREADMISSION TESTING | Age: 36
Discharge: HOME | End: 2021-04-30
Payer: COMMERCIAL

## 2021-04-30 LAB — SARS-COV-2, COV2: NORMAL

## 2021-04-30 PROCEDURE — U0003 INFECTIOUS AGENT DETECTION BY NUCLEIC ACID (DNA OR RNA); SEVERE ACUTE RESPIRATORY SYNDROME CORONAVIRUS 2 (SARS-COV-2) (CORONAVIRUS DISEASE [COVID-19]), AMPLIFIED PROBE TECHNIQUE, MAKING USE OF HIGH THROUGHPUT TECHNOLOGIES AS DESCRIBED BY CMS-2020-01-R: HCPCS

## 2021-05-01 LAB — SARS-COV-2, COV2NT: NOT DETECTED

## 2021-05-04 ENCOUNTER — HOSPITAL ENCOUNTER (OUTPATIENT)
Age: 36
Setting detail: OUTPATIENT SURGERY
Discharge: HOME | End: 2021-05-04
Attending: INTERNAL MEDICINE | Admitting: INTERNAL MEDICINE
Payer: COMMERCIAL

## 2021-05-04 VITALS
RESPIRATION RATE: 16 BRPM | SYSTOLIC BLOOD PRESSURE: 136 MMHG | DIASTOLIC BLOOD PRESSURE: 97 MMHG | BODY MASS INDEX: 30.23 KG/M2 | HEART RATE: 76 BPM | HEIGHT: 67 IN | OXYGEN SATURATION: 98 %

## 2021-05-04 PROCEDURE — 2709999900 HC NON-CHARGEABLE SUPPLY: Performed by: INTERNAL MEDICINE

## 2021-05-04 PROCEDURE — 76040000007: Performed by: INTERNAL MEDICINE

## 2021-05-04 PROCEDURE — 74011000250 HC RX REV CODE- 250: Performed by: INTERNAL MEDICINE

## 2021-05-04 RX ORDER — LIDOCAINE HYDROCHLORIDE 20 MG/ML
JELLY TOPICAL ONCE
Status: COMPLETED | OUTPATIENT
Start: 2021-05-04 | End: 2021-05-04

## 2021-05-04 RX ORDER — SPIRONOLACTONE 100 MG/1
100 TABLET, FILM COATED ORAL DAILY
COMMUNITY
End: 2022-09-19 | Stop reason: ALTCHOICE

## 2021-05-04 RX ADMIN — LIDOCAINE HYDROCHLORIDE 5 ML: 20 JELLY TOPICAL at 12:42

## 2021-05-04 NOTE — DISCHARGE INSTRUCTIONS
Keri Waldron  [id number]  1985      MANOMETRY DISCHARGE INSTRUCTION    You may resume your regular diet as tolerated. You may resume your normal daily activities. If you develop a sore throat- throat lozenges or warm salt water gargles will help. Call your Physician if you have any complications or questions. Track[employer] Activation    Thank you for requesting access to [employer]. Please follow the instructions below to securely access and download your online medical record. Tracky allows you to send messages to your doctor, view your test results, renew your prescriptions, schedule appointments, and more. How Do I Sign Up? 1. In your internet browser, go to [email or web address]  2. Click on the First Time User? Click Here link in the Sign In box. You will be redirect to the New Member Sign Up page. 3. Enter your Tracky Access Code exactly as it appears below. You will not need to use this code after youve completed the sign-up process. If you do not sign up before the expiration date, you must request a new code. Tracky Access Code: Activation code not generated  Current Tracky Status: Active (This is the date your Tracky access code will )    4. Enter the last four digits of your Social Security Number (xxxx) and Date of Birth (mm/dd/yyyy) as indicated and click Submit. You will be taken to the next sign-up page. 5. Create a Tracky ID. This will be your Tracky login ID and cannot be changed, so think of one that is secure and easy to remember. 6. Create a Tracky password. You can change your password at any time. 7. Enter your Password Reset Question and Answer. This can be used at a later time if you forget your password. 8. Enter your e-mail address. You will receive e-mail notification when new information is available in [address] 9. Click Sign Up. You can now view and download portions of your medical record.   10. Click the Download Summary menu link to download a portable copy of your medical information. Additional Information    If you have questions, please visit the Frequently Asked Questions section of the [employer] website at [email or web address] Remember, [employer] is NOT to be used for urgent needs. For medical emergencies, dial 911.

## 2021-05-21 NOTE — PROCEDURES
[address]  [phone]              Esophageal Manometry with Impedance      Patient:  Simon Kincaid    [id number] Gender: Female Physician: Randy Linares MD     / Age: 1985 : LYNDON Quigley RN    Height: 5'7\" Referring Physician:     Procedure: Esophageal HRM Examination Date: 2021       Swallow Composite (mean of 10 swallows) Resting Pressure Profile & Anatomy       Basal Pressures*  LES, respiratory min(mmHg) 29.0 (4.8-32. 0)  LES, respiratory mean(mmHg) 38.5 (13-43)  UES mean(mmHg) 40.7 ()    Anatomy*  LES proximal(cm) 41.7  LES intraabdominal(cm) 1.8  Esophageal length(cm) 26.1  Hiatal hernia No       Motility*  Distal contr. integral(mmHg-cm-s) 1356.9 (500-5000)  Number of hypercontractile swallows 0  Incomplete bolus clearance(%) 20   Residual Pressures*  LES (mean)(mmHg) 5.6  LES (median)(mmHg) 5.7 (<15.0)  UES (mean)(mmHg) -4.4 (<12.0)     Lower Esophageal Sphincter Region  Normal Esophageal Motility  Normal   Landmarks   Number of swallows evaluated 10        Proximal LES (from nares)(cm) 41.7  [place] Classification         LES length(cm) 3.3 2.7-4.8     % failed 10        Esophageal length (LES-UES centers)(cm) 26.1      % weak 10        Intraabdominal LES length(cm) 1.8      % ineffective 20        Hiatal hernia? No      % panesophageal pressurization 0    LES Pressures       % premature contraction 0        Pressure barbie. method eSleeve,IRP      % fragmented 0        Basal (respiratory min. )(mmHg) 29.0 4.8-32.0     % intact 80        Basal (respiratory mean)(mmHg) 38.5 13-43     Number of hypercontractile swallows 0        Residual (mean)(mmHg) 5.6  Additional High Resolution Parameters         Residual (median)(mmHg) 5.7 <15.0     Distal latency 8.8           Distal contractile integral(mean)(mmHg-cm-s) 1356.9 500-5000      Impedance analysis            Incomplete bolus clearance(%) 20            Upper Esophageal Sphincter  Normal Pharyngeal / UES Motility  Normal   Mean basal pressure(mmHg) 40.7  No. swallows evaluated 10    Mean residual pressure(mmHg) -4.4 <12.0 Evaluated @ 3.0 & N/A above UES            Mean peak pressure(mmHg) 17.8           Indications   Dysphagia     Interpretation / Findings   Within average lower esophageal sphincter length with adequate basal pressure and appropriate relaxation during swallows. Mostly normal amplitude peristaltic contractions seen through esophageal body with 20% incomplete bolus clearance. No obvious pressurization patterns or hiatal hernia noted. Impressions   Normal esophageal manometry per [place] classification v4.0.       Pacheco Melendez MD          (00:04:25, 00:05:25)         [place] Id (00:02:08, 00:03:08)           Swallow #1 (00:03:18, 00:04:18)        Normal Group / Mean Swal.  #1 Swal.  #2 Swal.  #3 Swal.  #4 Swal.  #5 Swal.  #6 Swal.  #7 Swal.  #8 Swal.  #9 Swal.  #10   ESOPHAGEAL MOTILITY                   LES residual pressure (mean) (mmHg)  5.6 6.9 7.9 6.9 6.3 5.9 5.5 2.7 5.4 5.2 3.5       LES residual pressure (median) (mmHg) <15.0 5.7 6.9 7.9 6.9 6.3 5.9 5.5 2.7 5.4 5.2 3.5   [place] Classification                   % failed  10 0 0 0 0 0 0 1 0 0 0       % weak  10 0 0 1 0 0 0 0 0 0 0       % ineffective  20 0 0 1 0 0 0 1 0 0 0       % panesophageal pressurization  0 0 0 0 0 0 0 0 0 0 0       % premature contraction  0 0 0 0 0 0 0 0 0 0 0       % fragmented  0 0 0 0 0 0 0 0 0 0 0       % intact  80 1 1 0 1 1 1 0 1 1 1       Number of hypercontractile swallows  0             Additional High Resolution Parameters                   Distal latency (sec)  8.8 8.6 8.3 9.0 9.2 8.9 8.9 N/A 8.2 9.2 9.1       Distal contractile integral (mmHg-cm-s) 500-5000 1356.9 1649.2 1280.1 429.3 1992.2 1377.3 1337.3 N/A 1736.9 1328.9 1081.1   IMPEDANCE                   Incomplete bolus clearance (% of swallows)  20 0 0 1 0 0 0 1 0 0 0   UES / PHARYNGEAL MOTILITY UES residual pressure (mean) (mmHg) <12.0 -4.4 -2.3 -3.2 -3.9 -7.9 -3.4 -3.3 -3.6 -5.0 -4.9 -6.7

## 2022-01-31 ENCOUNTER — TELEPHONE (OUTPATIENT)
Dept: FAMILY MEDICINE CLINIC | Age: 37
End: 2022-01-31

## 2022-01-31 NOTE — TELEPHONE ENCOUNTER
----- Message from Jagdeepdavi Diaz sent at 1/31/2022 10:31 AM EST -----  Subject: Appointment Request    Reason for Call: Routine Existing Condition Follow Up    QUESTIONS  Type of Appointment? Established Patient  Reason for appointment request? Available appointments did not meet   patient need  Additional Information for Provider? pt states she has covid and needs a   note for work to state why she is and will be off from work  ---------------------------------------------------------------------------  --------------  Jesse Markham INFO  What is the best way for the office to contact you? OK to leave message on   voicemail  Preferred Call Back Phone Number? [phone]  ---------------------------------------------------------------------------  --------------  SCRIPT ANSWERS  Relationship to Patient? Self  Is this follow up request related to routine Diabetes Management? No  Have you been diagnosed with, awaiting test results for, or told that you   are suspected of having COVID-19 (Coronavirus)? (If patient has tested   negative or was tested as a requirement for work, school, or travel and   not based on symptoms, answer no)? Yes  Did your symptoms begin within the past 14 days or was your positive test   result within the past 14 days?  Yes

## 2022-03-18 PROBLEM — G62.9 NEUROPATHY: Status: ACTIVE | Noted: 2018-09-21

## 2022-03-19 PROBLEM — K76.0 NAFL (NONALCOHOLIC FATTY LIVER): Status: ACTIVE | Noted: 2018-09-21

## 2022-03-20 PROBLEM — K20.0 EOSINOPHILIC ESOPHAGITIS: Status: ACTIVE | Noted: 2018-09-21

## 2022-09-12 ENCOUNTER — OFFICE VISIT (OUTPATIENT)
Dept: HEMATOLOGY | Age: 37
End: 2022-09-12
Payer: COMMERCIAL

## 2022-09-12 VITALS
BODY MASS INDEX: 21.31 KG/M2 | SYSTOLIC BLOOD PRESSURE: 118 MMHG | DIASTOLIC BLOOD PRESSURE: 88 MMHG | TEMPERATURE: 97.3 F | OXYGEN SATURATION: 98 % | HEIGHT: 67 IN | HEART RATE: 77 BPM | WEIGHT: 135.8 LBS

## 2022-09-12 DIAGNOSIS — K76.0 NAFL (NONALCOHOLIC FATTY LIVER): Primary | ICD-10-CM

## 2022-09-12 PROCEDURE — 91200 LIVER ELASTOGRAPHY: CPT | Performed by: PHYSICIAN ASSISTANT

## 2022-09-12 PROCEDURE — 99214 OFFICE O/P EST MOD 30 MIN: CPT | Performed by: PHYSICIAN ASSISTANT

## 2022-09-12 RX ORDER — TRAZODONE HYDROCHLORIDE 50 MG/1
TABLET ORAL
COMMUNITY
Start: 2022-08-21

## 2022-09-12 RX ORDER — ONDANSETRON 8 MG/1
8 TABLET, ORALLY DISINTEGRATING ORAL
Qty: 30 TABLET | Refills: 1 | Status: SHIPPED | OUTPATIENT
Start: 2022-09-12 | End: 2022-09-19 | Stop reason: ALTCHOICE

## 2022-09-12 NOTE — PROGRESS NOTES
[address] [facility], MD, [address], [place], [place]      ODILON [facility]-JIAN Peck, [facility]-BC   Spero Genta, FNP-C Luwanna Closs, [facility]       Ricardo Graham [facility] 136    at [address]    217 [facility], [address] [place] [address] Zay Turpin  22.    [phone]    FAX: [address] [place] [address] Avenue    [address], [address]    [phone]    FAX: [phone]       Patient Care Team:  Rusty Dodd NP as PCP - General (Nurse Practitioner)  Rusty Dodd NP as PCP - [facility] Dr Randhawa Provider  Jacob Babcock RN as Nurse Navigator (Family Medicine)      Problem List  Date Reviewed: 3/22/2021            Codes Class Noted    Eosinophilic esophagitis [id number]: K20.0  ICD-9-CM: 530.13  9/21/2018        NAFL (nonalcoholic fatty liver) [id number]: K76.0  ICD-9-CM: 571.8  9/21/2018        Neuropathy ICD-10-CM: G62.9  ICD-9-CM: 355.9  9/21/2018           Nataly Green returns to the [facility] for management of non-alcoholic fatty liver (NAFL). The active problem list, all pertinent past medical history, medications, radiologic findings and laboratory findings related to the liver disorder were reviewed with the patient. This is her first presentation to this office in just under 3 years. The patient is a 44 y.o.  female who is suspected to have fatty liver disease based upon ultrasound. This has also been suggested on the basis of past Fibroscan analysis. Serologic evaluation for markers of chronic liver disease were negative. Ultrasound of the liver was performed in 8/2018. The results of the imaging are not available at this time. The patient believes this suggested fatty liver disease.       Assessment of liver fibrosis was performed ion numerous occasions in the past, last in 2019. This has always shown low scoring fibrosis scores with steatosis. We plan to repeat this study in the office today. The patient notes fatigue, and nausea. These have been profound in the past 6 months and getting worse in the past 6 weeks. She has a history of eosinophilic esophagitis and has been under the care of Dr Aiyana Short for this in the past.   She has been treating with liquid Benadryl and inhaled steroids and reports that she has had a negative EGD within the past year. She has been on [facility][employer] for weight loss and stopped this as of one month ago. She has continued to lose weight and have symptoms of nausea and N/V have been getting worse. She has not seen GI for repeat evaluation as of yet. She reports that she has had some pain in the right side and flank. She had been taking Wegovy from 12/2021 until 8/2022, this was being given online. She was 209# in 12/2021 prior to starting medication and is down to 135# at this time. She hasn't been able to keep anything down since stopping [facility][employer] ~8/1/2022 but has largely been taking protein shakes daily. She has had extensive allergy testing and is having responses to almost everything and is carrying Epipen for anaphylactic reactions, she has had to use one time thus far. She is concerned as she has had epigastric, RUQ and right flank pain in the past several weeks. ASSESSMENT AND PLAN:  NAFL/benign steatosis  The diagnosis is based upon Fiboscan CAP score, and imaging. Elastography performed in office today at her request has shown total resolution of steatosis with her recent weight losses. I have advised that there is no indication for long-term follow-up in this office if labs continue to show normal enzymes and function. RUQ/R side/flank pain. Patient is tender to palpation of the epigastrium and has CV tenderness of the right.   I have ordered lipase, UA, and ultrasound to assess and I have given her a new prescription for ondansetron prn until she can get further evaluation with GI. Nausea/emesis/eosinophilic esophagitis. She has significant symptoms in past 6 months and has had no further follow-up with GI since this has worsened. I have encouraged her to follow-up as soon as possible with GI for further evaluation. She will likely need nutrition consult as well as she is unsure what she can eat at this time. Treatment of other medical problems in patients with chronic liver disease  There are no contraindications for the patient to take any medications that are necessary for treatment of other medical issues. The patient does not consume alcohol daily. Normal doses of acetaminophen can be used for pain as needed. Normal doses of acetaminophen as recommended on the label are not hepatotoxic, even in patients with cirrhosis. The patient does not have cirrhosis. Normal doses of NSAIDs can be used for pain as needed. The patient can specifically take oral contraception medications without restrictions. Counseling for alcohol in patients with chronic liver disease  The patient was counseled regarding alcohol consumption and the effect of alcohol on chronic liver disease. The patient does not consume any significant amount of alcohol. Vaccinations   The need for vaccination against viral hepatitis A and B will be assessed with serologic and instituted as appropriate. Routine vaccinations against other bacterial and viral agents can be performed as indicated. Annual flu vaccination should be administered if indicated.     ALLERGIES  Allergies   Allergen Reactions    Morphine Itching and Rash       MEDICATIONS  Current Outpatient Medications   Medication Sig    traZODone (DESYREL) 50 mg tablet     omeprazole (PRILOSEC) 20 mg capsule 1 (ONE) CAPSULE ONCE DAILY AT LEAST 30 MINUTES BEFORE BREAKFAST    drospirenone-ethinyl estradioL (АННА) 3-0.02 mg tab Take  by mouth daily. spironolactone (ALDACTONE) 100 mg tablet Take 100 mg by mouth daily. Indications: ACNE    busPIRone (BUSPAR) 10 mg tablet TAKE 1 TABLET BY MOUTH THREE TIMES A DAY     No current facility-administered medications for this visit. SYSTEM REVIEW NOT RELATED TO LIVER DISEASE OR REVIEWED ABOVE:  Constitution systems: Negative for fever, chills, weight gain, weight loss. Eyes: Negative for visual changes. ENT: Negative for sore throat, painful swallowing. Respiratory: Negative for cough, hemoptysis, SOB. Cardiology: Negative for chest pain, palpitations. GI:  Negative for constipation or diarrhea. : Negative for urinary frequency, dysuria, hematuria, nocturia. Skin: Negative for rash. Hematology: Negative for easy bruising, blood clots. Musculo-skeletal: Negative for back pain, muscle pain, weakness. Neurologic: Negative for headaches, dizziness, vertigo, memory problems not related to HE. Psychology: Negative for anxiety, depression. FAMILY HISTORY:  The patient has no knowledge of the father's medical condition. The mother has the following chronic diseases: alcoholism. There is no family history of liver disease. SOCIAL HISTORY:  The patient is . The patient has 3 children. The patient has never used tobacco products. The patient has never consumed significant amounts of alcohol. 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 PHYSICAL EXAMINATION:  VS: per nursing note  General: No acute distress. Eyes: Sclera anicteric. ENT: No oral lesions. Thyroid normal.  Nodes: No adenopathy. Skin: No spider angiomata. No jaundice. No palmar erythema. Respiratory: Lungs clear to auscultation. Cardiovascular: Regular heart rate. No murmurs. No JVD. Abdomen: Soft non-tender, liver size normal to percussion/palpation. Spleen not palpable. No obvious ascites. Extremities: No edema.   No muscle wasting. No gross arthritic changes. Neurologic: Alert and oriented. Cranial nerves grossly intact. No asterixis. LABORATORY STUDIES:  [facility] [place] of [address] Units 11/4/2019 5/3/2019   AST 0 - 40 IU/L 17 12   ALT 0 - 32 IU/L 12 15   Alk Phos 39 - 117 IU/L 69 69   Bili, Total 0.0 - 1.2 mg/dL 1.0 0.8   Bili, Direct 0.00 - 0.40 mg/dL 0.25 0.21   Albumin 3.5 - 5.5 g/dL 4.7 4.8     From 8/2018  AST/ALT/ALP/T Bili/ALB:  46/61/87/1.2/4.6  WBC/HB/PLT/INR:  6.1/14.7/362    Additional lab values drawn at today's office visit are pending at the time of documentation. SEROLOGIES:  8/2018. HAV total positive, HBsAntigen negative, anti-HBsurface positive, anti-HCV negative, Ferritin 64, iron saturation 28%, ASMA negative, ceruloplasmin 32. LIVER HISTOLOGY:  9/2018. FibroScan performed at [address] EkPa was 6.1. IQR/med 3%. . The results suggested a fibrosis level of F0-1. The CAP score suggests fatty liver. 11/2019. FibroScan performed at [address]. EkPa was 4.3. Suggested fibrosis level is F0-1. CAP score is 295, this is suggestive of steatosis. 9/2022. FibroScan performed at [address]. EkPa was 5.2. Suggested fibrosis level is F0-1. CAP score is 220, there is no suggestion of steatosis at this time. ENDOSCOPIC PROCEDURES:  Not available or performed    RADIOLOGY:  Not available. This reportedly showed steatosis. OTHER TESTING:  Not available or performed      All of the issues listed in the Assessment and Plan were discussed with the patient. All questions were answered. The patient expressed a clear understanding of the above. Follow-up Paco Peacock 32 prn only. I will be in touch with patient regarding results of ordered lab tests and imaging as available and I have asked her to follow-up as soon as possible with Dr Ranjan Goss.      Deanna Guzman PA-C  [facility] [place] [address] [address] Kendrick Turpin, [address] Zay Rubio  22. [phone]

## 2022-09-12 NOTE — PROGRESS NOTES
Identified pt with two pt identifiers(name and ). Reviewed record in preparation for visit and have obtained necessary documentation. Chief Complaint   Patient presents with    Fatty Liver     FS with Mj Lopez      Vitals:    22 1445   BP: 118/88   Pulse: 77   Temp: 97.3 °F (36.3 °C)   TempSrc: Temporal   SpO2: 98%   Weight: 135 lb 12.8 oz (61.6 kg)   Height: 5' 7\" (1.702 m)   PainSc:   5       Health Maintenance Review: Patient reminded of \"due or due soon\" health maintenance. I have asked the patient to contact his/her primary care provider (PCP) for follow-up on his/her health maintenance. Coordination of Care Questionnaire:  :   1) Have you been to an emergency room, urgent care, or hospitalized since your last visit? If yes, where when, and reason for visit? no       2. Have seen or consulted any other health care provider since your last visit? If yes, where when, and reason for visit? NO      Patient is accompanied by son I have received verbal consent from [address] [place] [address] to discuss any/all medical information while they are present in the room.

## 2022-09-13 LAB
ALBUMIN SERPL-MCNC: 4.3 G/DL (ref 3.5–5)
ALBUMIN/GLOB SERPL: 1.3 {RATIO} (ref 1.1–2.2)
ALP SERPL-CCNC: 60 U/L (ref 45–117)
ALT SERPL-CCNC: 31 U/L (ref 12–78)
ANION GAP SERPL CALC-SCNC: 6 MMOL/L (ref 5–15)
APPEARANCE UR: CLEAR
AST SERPL-CCNC: 13 U/L (ref 15–37)
BACTERIA URNS QL MICRO: NEGATIVE /HPF
BILIRUB DIRECT SERPL-MCNC: 0.3 MG/DL (ref 0–0.2)
BILIRUB SERPL-MCNC: 1.2 MG/DL (ref 0.2–1)
BILIRUB UR QL: NEGATIVE
BUN SERPL-MCNC: 9 MG/DL (ref 6–20)
BUN/CREAT SERPL: 9 (ref 12–20)
CALCIUM SERPL-MCNC: 9.3 MG/DL (ref 8.5–10.1)
CHLORIDE SERPL-SCNC: 106 MMOL/L (ref 97–108)
CO2 SERPL-SCNC: 25 MMOL/L (ref 21–32)
COLOR UR: NORMAL
CREAT SERPL-MCNC: 1.01 MG/DL (ref 0.55–1.02)
EPITH CASTS URNS QL MICRO: NORMAL /LPF
ERYTHROCYTE [DISTWIDTH] IN BLOOD BY AUTOMATED COUNT: 12.2 % (ref 11.5–14.5)
GLOBULIN SER CALC-MCNC: 3.4 G/DL (ref 2–4)
GLUCOSE SERPL-MCNC: 86 MG/DL (ref 65–100)
GLUCOSE UR STRIP.AUTO-MCNC: NEGATIVE MG/DL
HCT VFR BLD AUTO: 43 % (ref 35–47)
HGB BLD-MCNC: 15 G/DL (ref 11.5–16)
HGB UR QL STRIP: NEGATIVE
HYALINE CASTS URNS QL MICRO: NORMAL /LPF (ref 0–5)
KETONES UR QL STRIP.AUTO: NEGATIVE MG/DL
LEUKOCYTE ESTERASE UR QL STRIP.AUTO: NEGATIVE
LIPASE SERPL-CCNC: 111 U/L (ref 73–393)
MCH RBC QN AUTO: 32.5 PG (ref 26–34)
MCHC RBC AUTO-ENTMCNC: 34.9 G/DL (ref 30–36.5)
MCV RBC AUTO: 93.3 FL (ref 80–99)
NITRITE UR QL STRIP.AUTO: NEGATIVE
NRBC # BLD: 0 K/UL (ref 0–0.01)
NRBC BLD-RTO: 0 PER 100 WBC
PH UR STRIP: 6 [PH] (ref 5–8)
PLATELET # BLD AUTO: 344 K/UL (ref 150–400)
PMV BLD AUTO: 10.9 FL (ref 8.9–12.9)
POTASSIUM SERPL-SCNC: 4.2 MMOL/L (ref 3.5–5.1)
PROT SERPL-MCNC: 7.7 G/DL (ref 6.4–8.2)
PROT UR STRIP-MCNC: NEGATIVE MG/DL
RBC # BLD AUTO: 4.61 M/UL (ref 3.8–5.2)
RBC #/AREA URNS HPF: NORMAL /HPF (ref 0–5)
SODIUM SERPL-SCNC: 137 MMOL/L (ref 136–145)
SP GR UR REFRACTOMETRY: 1.01 (ref 1–1.03)
UA: UC IF INDICATED,UAUC: NORMAL
UROBILINOGEN UR QL STRIP.AUTO: 0.2 EU/DL (ref 0.2–1)
WBC # BLD AUTO: 6.7 K/UL (ref 3.6–11)
WBC URNS QL MICRO: NORMAL /HPF (ref 0–4)

## 2022-09-14 ENCOUNTER — HOSPITAL ENCOUNTER (EMERGENCY)
Age: 37
Discharge: HOME | End: 2022-09-14
Attending: EMERGENCY MEDICINE | Admitting: EMERGENCY MEDICINE
Payer: COMMERCIAL

## 2022-09-14 ENCOUNTER — APPOINTMENT (OUTPATIENT)
Dept: ULTRASOUND IMAGING | Age: 37
End: 2022-09-14
Attending: EMERGENCY MEDICINE
Payer: COMMERCIAL

## 2022-09-14 ENCOUNTER — APPOINTMENT (OUTPATIENT)
Dept: CT IMAGING | Age: 37
End: 2022-09-14
Attending: EMERGENCY MEDICINE
Payer: COMMERCIAL

## 2022-09-14 VITALS
RESPIRATION RATE: 16 BRPM | OXYGEN SATURATION: 100 % | DIASTOLIC BLOOD PRESSURE: 86 MMHG | TEMPERATURE: 97.7 F | HEART RATE: 83 BPM | SYSTOLIC BLOOD PRESSURE: 117 MMHG

## 2022-09-14 DIAGNOSIS — R10.84 ABDOMINAL PAIN, GENERALIZED: ICD-10-CM

## 2022-09-14 DIAGNOSIS — R10.11 RUQ PAIN: Primary | ICD-10-CM

## 2022-09-14 LAB
ALBUMIN SERPL-MCNC: 4.1 G/DL (ref 3.5–5)
ALBUMIN/GLOB SERPL: 1.1 {RATIO} (ref 1.1–2.2)
ALP SERPL-CCNC: 60 U/L (ref 45–117)
ALT SERPL-CCNC: 33 U/L (ref 12–78)
ANION GAP SERPL CALC-SCNC: 5 MMOL/L (ref 5–15)
APPEARANCE UR: CLEAR
AST SERPL-CCNC: 24 U/L (ref 15–37)
BACTERIA URNS QL MICRO: NEGATIVE /HPF
BASOPHILS # BLD: 0.1 K/UL (ref 0–0.1)
BASOPHILS NFR BLD: 1 % (ref 0–1)
BILIRUB SERPL-MCNC: 1.4 MG/DL (ref 0.2–1)
BILIRUB UR QL: NEGATIVE
BUN SERPL-MCNC: 9 MG/DL (ref 6–20)
BUN/CREAT SERPL: 9 (ref 12–20)
CALCIUM SERPL-MCNC: 8.9 MG/DL (ref 8.5–10.1)
CHLORIDE SERPL-SCNC: 109 MMOL/L (ref 97–108)
CO2 SERPL-SCNC: 24 MMOL/L (ref 21–32)
COLOR UR: ABNORMAL
COMMENT, HOLDF: NORMAL
CREAT SERPL-MCNC: 0.97 MG/DL (ref 0.55–1.02)
DIFFERENTIAL METHOD BLD: NORMAL
EOSINOPHIL # BLD: 0.1 K/UL (ref 0–0.4)
EOSINOPHIL NFR BLD: 2 % (ref 0–7)
EPITH CASTS URNS QL MICRO: ABNORMAL /LPF
ERYTHROCYTE [DISTWIDTH] IN BLOOD BY AUTOMATED COUNT: 12.1 % (ref 11.5–14.5)
GLOBULIN SER CALC-MCNC: 3.8 G/DL (ref 2–4)
GLUCOSE SERPL-MCNC: 86 MG/DL (ref 65–100)
GLUCOSE UR STRIP.AUTO-MCNC: NEGATIVE MG/DL
HCG UR QL: NEGATIVE
HCT VFR BLD AUTO: 42 % (ref 35–47)
HGB BLD-MCNC: 15.3 G/DL (ref 11.5–16)
HGB UR QL STRIP: NEGATIVE
IMM GRANULOCYTES # BLD AUTO: 0 K/UL (ref 0–0.04)
IMM GRANULOCYTES NFR BLD AUTO: 0 % (ref 0–0.5)
KETONES UR QL STRIP.AUTO: ABNORMAL MG/DL
LEUKOCYTE ESTERASE UR QL STRIP.AUTO: NEGATIVE
LIPASE SERPL-CCNC: 93 U/L (ref 73–393)
LYMPHOCYTES # BLD: 1.7 K/UL (ref 0.8–3.5)
LYMPHOCYTES NFR BLD: 32 % (ref 12–49)
MCH RBC QN AUTO: 33.1 PG (ref 26–34)
MCHC RBC AUTO-ENTMCNC: 36.4 G/DL (ref 30–36.5)
MCV RBC AUTO: 90.9 FL (ref 80–99)
MONOCYTES # BLD: 0.4 K/UL (ref 0–1)
MONOCYTES NFR BLD: 7 % (ref 5–13)
MUCOUS THREADS URNS QL MICRO: ABNORMAL /LPF
NEUTS SEG # BLD: 3.1 K/UL (ref 1.8–8)
NEUTS SEG NFR BLD: 58 % (ref 32–75)
NITRITE UR QL STRIP.AUTO: NEGATIVE
NRBC # BLD: 0 K/UL (ref 0–0.01)
NRBC BLD-RTO: 0 PER 100 WBC
PH UR STRIP: 6 [PH] (ref 5–8)
PLATELET # BLD AUTO: 292 K/UL (ref 150–400)
PMV BLD AUTO: 9.8 FL (ref 8.9–12.9)
POTASSIUM SERPL-SCNC: 3.9 MMOL/L (ref 3.5–5.1)
PROT SERPL-MCNC: 7.9 G/DL (ref 6.4–8.2)
PROT UR STRIP-MCNC: NEGATIVE MG/DL
RBC # BLD AUTO: 4.62 M/UL (ref 3.8–5.2)
RBC #/AREA URNS HPF: ABNORMAL /HPF (ref 0–5)
SAMPLES BEING HELD,HOLD: NORMAL
SODIUM SERPL-SCNC: 138 MMOL/L (ref 136–145)
SP GR UR REFRACTOMETRY: 1.02 (ref 1–1.03)
UR CULT HOLD, URHOLD: NORMAL
UROBILINOGEN UR QL STRIP.AUTO: 0.2 EU/DL (ref 0.2–1)
WBC # BLD AUTO: 5.3 K/UL (ref 3.6–11)
WBC URNS QL MICRO: ABNORMAL /HPF (ref 0–4)

## 2022-09-14 PROCEDURE — 96375 TX/PRO/DX INJ NEW DRUG ADDON: CPT | Performed by: EMERGENCY MEDICINE

## 2022-09-14 PROCEDURE — 74011250636 HC RX REV CODE- 250/636: Performed by: EMERGENCY MEDICINE

## 2022-09-14 PROCEDURE — 80053 COMPREHEN METABOLIC PANEL: CPT

## 2022-09-14 PROCEDURE — 99285 EMERGENCY DEPT VISIT HI MDM: CPT | Performed by: EMERGENCY MEDICINE

## 2022-09-14 PROCEDURE — 74011000250 HC RX REV CODE- 250: Performed by: EMERGENCY MEDICINE

## 2022-09-14 PROCEDURE — 74177 CT ABD & PELVIS W/CONTRAST: CPT

## 2022-09-14 PROCEDURE — 96376 TX/PRO/DX INJ SAME DRUG ADON: CPT | Performed by: EMERGENCY MEDICINE

## 2022-09-14 PROCEDURE — 36415 COLL VENOUS BLD VENIPUNCTURE: CPT

## 2022-09-14 PROCEDURE — 85025 COMPLETE CBC W/AUTO DIFF WBC: CPT

## 2022-09-14 PROCEDURE — 81025 URINE PREGNANCY TEST: CPT

## 2022-09-14 PROCEDURE — 76705 ECHO EXAM OF ABDOMEN: CPT

## 2022-09-14 PROCEDURE — 74011000636 HC RX REV CODE- 636: Performed by: INTERNAL MEDICINE

## 2022-09-14 PROCEDURE — 81001 URINALYSIS AUTO W/SCOPE: CPT

## 2022-09-14 PROCEDURE — 74011250637 HC RX REV CODE- 250/637: Performed by: EMERGENCY MEDICINE

## 2022-09-14 PROCEDURE — 83690 ASSAY OF LIPASE: CPT

## 2022-09-14 PROCEDURE — 76700 US EXAM ABDOM COMPLETE: CPT

## 2022-09-14 PROCEDURE — 96374 THER/PROPH/DIAG INJ IV PUSH: CPT | Performed by: EMERGENCY MEDICINE

## 2022-09-14 RX ORDER — KETOROLAC TROMETHAMINE 30 MG/ML
30 INJECTION, SOLUTION INTRAMUSCULAR; INTRAVENOUS
Status: COMPLETED | OUTPATIENT
Start: 2022-09-14 | End: 2022-09-14

## 2022-09-14 RX ORDER — ONDANSETRON 2 MG/ML
4 INJECTION INTRAMUSCULAR; INTRAVENOUS ONCE
Status: COMPLETED | OUTPATIENT
Start: 2022-09-14 | End: 2022-09-14

## 2022-09-14 RX ORDER — FENTANYL CITRATE 50 UG/ML
50 INJECTION, SOLUTION INTRAMUSCULAR; INTRAVENOUS ONCE
Status: COMPLETED | OUTPATIENT
Start: 2022-09-14 | End: 2022-09-14

## 2022-09-14 RX ORDER — ONDANSETRON 4 MG/1
4 TABLET, ORALLY DISINTEGRATING ORAL
Qty: 12 TABLET | Refills: 0 | Status: SHIPPED | OUTPATIENT
Start: 2022-09-14

## 2022-09-14 RX ORDER — DICYCLOMINE HYDROCHLORIDE 10 MG/1
10 CAPSULE ORAL 3 TIMES DAILY
Qty: 21 CAPSULE | Refills: 0 | Status: SHIPPED | OUTPATIENT
Start: 2022-09-14 | End: 2022-09-19 | Stop reason: ALTCHOICE

## 2022-09-14 RX ADMIN — FENTANYL CITRATE 50 MCG: 50 INJECTION, SOLUTION INTRAMUSCULAR; INTRAVENOUS at 12:16

## 2022-09-14 RX ADMIN — ONDANSETRON 4 MG: 2 INJECTION INTRAMUSCULAR; INTRAVENOUS at 10:48

## 2022-09-14 RX ADMIN — Medication 40 ML: at 16:00

## 2022-09-14 RX ADMIN — ONDANSETRON 4 MG: 2 INJECTION INTRAMUSCULAR; INTRAVENOUS at 16:01

## 2022-09-14 RX ADMIN — SODIUM CHLORIDE 1000 ML: 9 INJECTION, SOLUTION INTRAVENOUS at 10:48

## 2022-09-14 RX ADMIN — KETOROLAC TROMETHAMINE 30 MG: 30 INJECTION, SOLUTION INTRAMUSCULAR; INTRAVENOUS at 10:48

## 2022-09-14 RX ADMIN — IOPAMIDOL 100 ML: 755 INJECTION, SOLUTION INTRAVENOUS at 13:11

## 2022-09-14 NOTE — ED NOTES
Bedside and Verbal shift change report given to Nimo Daugherty (oncoming nurse) by Celestino Simms (offgoing nurse). Report included the following information SBAR, ED Summary, MAR, and Recent Results.

## 2022-09-14 NOTE — ED PROVIDER NOTES
Date of Service:  09/14/22      Patient:  Alaina Lowery    Chief Complaint:  Abdominal Pain       HPI:  Alaina Lowery is a 44 y.o.  female who presents for evaluation of abdominal pain nausea and vomiting. Patient states he has had several months worth of intermittent right upper quadrant discomfort that over the last week is gotten acutely worse. 10 out of 10 right upper quadrant pain radiation to the back. Nausea and vomiting with anything she tries to eat. She is scheduled for a ultrasound this afternoon but was unable to make it due to severe discomfort. 10 out of 10 pain made worse by eating. Recent weight loss while on weight loss medicine which she is no longer taking. She is lost about 70 to 80 pounds. She denies any dysuria or vaginal complaints or other GI/ issues.        Past Medical History:   Diagnosis Date    Eosinophilic esophagitis 5/78/9060    HX OTHER MEDICAL     neuropathy of right leg    NAFL (nonalcoholic fatty liver)        Past Surgical History:   Procedure Laterality Date    HX ENDOSCOPY  56/91/4698    Eosinophilic espohogitis    HX OTHER SURGICAL      dnc, fibroid in uterus removed    HX OTHER SURGICAL      tubes in ears         Family History:   Problem Relation Age of Onset    Alcohol abuse Mother     Psychiatric Disorder Mother     No Known Problems Father         does not know father's family hx    Lupus Maternal Grandmother     Bipolar Disorder Daughter 6    Arthritis-rheumatoid Neg Hx     Asthma Neg Hx     Bleeding Prob Neg Hx     Cancer Neg Hx     Diabetes Neg Hx     Elevated Lipids Neg Hx     Headache Neg Hx     Heart Disease Neg Hx     Hypertension Neg Hx     Lung Disease Neg Hx     Migraines Neg Hx     Stroke Neg Hx     Mental Retardation Neg Hx        Social History     Socioeconomic History    Marital status:      Spouse name: Not on file    Number of children: Not on file    Years of education: Not on file    Highest education level: Not on file   Occupational History    Not on file   Tobacco Use    Smoking status: Never    Smokeless tobacco: Never   Vaping Use    Vaping Use: Never used   Substance and Sexual Activity    Alcohol use: No    Drug use: No    Sexual activity: Yes     Partners: Male     Birth control/protection: None   Other Topics Concern     Service Not Asked    Blood Transfusions Not Asked    Caffeine Concern Not Asked    Occupational Exposure Not Asked    Hobby Hazards Not Asked    Sleep Concern Not Asked    Stress Concern Not Asked    Weight Concern Not Asked    Special Diet Not Asked    Back Care Not Asked    Exercise Not Asked    Bike Helmet Not Asked    Seat Belt Not Asked    Self-Exams Not Asked   Social History Narrative    Not on file     Social Determinants of Health     Financial Resource Strain: Not on file   Food Insecurity: Not on file   Transportation Needs: Not on file   Physical Activity: Not on file   Stress: Not on file   Social Connections: Not on file   Intimate Partner Violence: Not on file   Housing Stability: Not on file         ALLERGIES: Morphine    Review of Systems   Gastrointestinal:  Positive for abdominal pain, nausea and vomiting. All other systems reviewed and are negative. Vitals:    09/14/22 0922   BP: (!) 166/89   Pulse: (!) 112   Resp: 16   Temp: 97.2 °F (36.2 °C)   SpO2: 98%            Physical Exam  Vitals and nursing note reviewed. Constitutional:       General: She is in acute distress. Appearance: She is well-developed. She is not ill-appearing. HENT:      Head: Normocephalic and atraumatic. Nose: Nose normal.      Mouth/Throat:      Mouth: Mucous membranes are dry. Eyes:      General: No scleral icterus. Cardiovascular:      Rate and Rhythm: Normal rate. Pulmonary:      Effort: Pulmonary effort is normal.   Abdominal:      General: Abdomen is flat. Tenderness: There is abdominal tenderness in the right upper quadrant. Positive signs include Wagner's sign.    Musculoskeletal: General: No deformity. Skin:     General: Skin is warm. Capillary Refill: Capillary refill takes less than 2 seconds. Findings: No rash. Neurological:      Mental Status: She is alert and oriented to person, place, and time. Psychiatric:         Mood and Affect: Mood normal.        MDM     VITAL SIGNS:  No data found. LABS:  No results found for this or any previous visit (from the past 6 hour(s)). IMAGING:  CT ABD PELV W CONT   Final Result   No acute intraperitoneal process is identified. Please see above for additional nonemergent incidental findings. US ABD COMP   Final Result   Normal abdominal ultrasound examination. Medications During Visit:  Medications   ondansetron (ZOFRAN) injection 4 mg (4 mg IntraVENous Given 9/14/22 1048)   sodium chloride 0.9 % bolus infusion 1,000 mL (0 mL IntraVENous IV Completed 9/14/22 1223)   ketorolac (TORADOL) injection 30 mg (30 mg IntraVENous Given 9/14/22 1048)   fentaNYL citrate (PF) injection 50 mcg (50 mcg IntraVENous Given 9/14/22 1216)   iopamidoL (ISOVUE-370) 76 % injection 100 mL (100 mL IntraVENous Given 9/14/22 1311)   ondansetron (ZOFRAN) injection 4 mg (4 mg IntraVENous Given 9/14/22 1601)   mylanta/viscous lidocaine (GI COCKTAIL) (40 mL Oral Given 9/14/22 1600)         DECISION MAKING:  Brett Rosa is a 44 y.o. female who comes in as above. Here, patient's diagnostics are grossly unremarkable. Patient is able to now tolerate p.o. and not feeling as bad as she did. Stressed outpatient follow-up with specialist and medicines as below. Cause of her pain unknown given the negative ultrasound and CT and normal labs. Could be biliary in nature, follow-up with general surgery      IMPRESSION:  1. RUQ pain    2.  Abdominal pain, generalized        DISPOSITION:  Discharged      Discharge Medication List as of 9/14/2022  5:29 PM        START taking these medications    Details   !! ondansetron (ZOFRAN ODT) 4 mg disintegrating tablet Take 1 Tablet by mouth every eight (8) hours as needed for Nausea for up to 12 doses. , Normal, Disp-12 Tablet, R-0      dicyclomine (BENTYL) 10 mg capsule Take 1 Capsule by mouth three (3) times daily for 7 days. , Normal, Disp-21 Capsule, R-0       !! - Potential duplicate medications found. Please discuss with provider. CONTINUE these medications which have NOT CHANGED    Details   traZODone (DESYREL) 50 mg tablet Historical Med      !! ondansetron (ZOFRAN ODT) 8 mg disintegrating tablet Take 1 Tablet by mouth every eight (8) hours as needed for Nausea or Vomiting., Normal, Disp-30 Tablet, R-1      spironolactone (ALDACTONE) 100 mg tablet Take 100 mg by mouth daily. Indications: ACNE, Historical Med      busPIRone (BUSPAR) 10 mg tablet TAKE 1 TABLET BY MOUTH THREE TIMES A DAY, Normal, Disp-90 Tab, R-1      omeprazole (PRILOSEC) 20 mg capsule 1 (ONE) CAPSULE ONCE DAILY AT LEAST 30 MINUTES BEFORE BREAKFAST, Historical Med      drospirenone-ethinyl estradioL (АННА) 3-0.02 mg tab Take  by mouth daily. , Historical Med       !! - Potential duplicate medications found. Please discuss with provider. Follow-up Information       Follow up With Specialties Details Why Contact Maddy Pedroza NP Family Nurse Practitioner, Nurse Practitioner Schedule an appointment as soon as possible for a visit   [address] [place] [address]      Jennifer Ceja MD General Surgery, Bariatrics Call  As needed [address] [place] [address] [place]  [place] [id number] [phone]                The patient is asked to follow-up with their primary care provider in the next several days. They are to call tomorrow for an appointment. The patient is asked to return promptly for any increased concerns or worsening of symptoms. They can return to this emergency department or any other emergency department.       Procedures

## 2022-09-14 NOTE — ED TRIAGE NOTES
Pt c/o upper abd pain for months, scheduled for ultrasound this evening but the pain got worse, +n/v, denies fever, denies diarrhea or constipation, denies urinary symptoms or pregnancy

## 2022-09-14 NOTE — ED NOTES
Patient provided with AVS, prescription, and follow up information. Questions answered, patient verbalized understanding. Ambulatory to ED exit without difficulty.

## 2022-09-19 ENCOUNTER — OFFICE VISIT (OUTPATIENT)
Dept: SURGERY | Age: 37
End: 2022-09-19
Payer: COMMERCIAL

## 2022-09-19 VITALS
BODY MASS INDEX: 20.72 KG/M2 | SYSTOLIC BLOOD PRESSURE: 92 MMHG | WEIGHT: 132 LBS | RESPIRATION RATE: 16 BRPM | HEART RATE: 105 BPM | OXYGEN SATURATION: 97 % | HEIGHT: 67 IN | DIASTOLIC BLOOD PRESSURE: 63 MMHG | TEMPERATURE: 98.1 F

## 2022-09-19 DIAGNOSIS — K82.8 BILIARY DYSKINESIA: Primary | ICD-10-CM

## 2022-09-19 PROCEDURE — 99204 OFFICE O/P NEW MOD 45 MIN: CPT | Performed by: SURGERY

## 2022-09-19 NOTE — PROGRESS NOTES
General Surgery Office Consultation / H & P    CC: Abdominal pain  History of Present Illness:      Gaudencio Canseco is a 44 y.o. female who presents with abdominal pain. Patient reports that ever since April she has developed abdominal pain in the right upper quadrant and postprandial nausea and emesis. She has been to the ER twice in the last 2 weeks. She reports that she was on Wegovy back in December and has since passed her goal weight. She has lost about 80 pounds. She reports that her pain happens after any food but especially after meat. She reports the pain is a dull pain that then builds to a sharp pain that is a 9 or 10 out of 10 with radiation to her back. Time helps the pain along with emesis. Pain medication helps some. She denies any abdominal surgeries. History of EOE and has had regular endoscopies for this.     Past Medical History:   Diagnosis Date    Eosinophilic esophagitis 8/59/1850    HX OTHER MEDICAL     neuropathy of right leg    NAFL (nonalcoholic fatty liver)      Past Surgical History:   Procedure Laterality Date    HX ENDOSCOPY  64/62/6971    Eosinophilic espohogitis    HX OTHER SURGICAL      dnc, fibroid in uterus removed    HX OTHER SURGICAL      tubes in ears      Family History   Problem Relation Age of Onset    Alcohol abuse Mother     Psychiatric Disorder Mother     No Known Problems Father         does not know father's family hx    Lupus Maternal Grandmother     Bipolar Disorder Daughter 6    Arthritis-rheumatoid Neg Hx     Asthma Neg Hx     Bleeding Prob Neg Hx     Cancer Neg Hx     Diabetes Neg Hx     Elevated Lipids Neg Hx     Headache Neg Hx     Heart Disease Neg Hx     Hypertension Neg Hx     Lung Disease Neg Hx     Migraines Neg Hx     Stroke Neg Hx     Mental Retardation Neg Hx      Social History     Socioeconomic History    Marital status:    Tobacco Use    Smoking status: Never    Smokeless tobacco: Never   Vaping Use    Vaping Use: Never used Substance and Sexual Activity    Alcohol use: No    Drug use: No    Sexual activity: Yes     Partners: Male     Birth control/protection: None      Prior to Admission medications    Medication Sig Start Date End Date Taking? Authorizing Provider   MULTIVITAMIN PO Take  by mouth. Yes Provider, Historical   ondansetron (ZOFRAN ODT) 4 mg disintegrating tablet Take 1 Tablet by mouth every eight (8) hours as needed for Nausea for up to 12 doses. 9/14/22  Yes Troy Nobles, DO   traZODone (DESYREL) 50 mg tablet  8/21/22  Yes Provider, Historical   omeprazole (PRILOSEC) 20 mg capsule daily as needed. 3/5/21  Yes Provider, Historical   drospirenone-ethinyl estradioL (АННА) 3-0.02 mg tab Take  by mouth daily.    Yes Provider, Historical     Allergies   Allergen Reactions    Morphine Itching and Rash       Review of Systems:  Constitutional: No fever or chills  Neurologic: No headache  Eyes: No scleral icterus or irritated eyes  Nose: No nasal pain or drainage  Mouth: No oral lesions or sore throat  Cardiac: No palpations or chest pain  Pulmonary: No cough or shortness of breath  Gastrointestinal: Abdominal pain, nausea, emesis, no diarrhea, or constipation  Genitourinary: No dysuria  Musculoskeletal: No muscle or joint tenderness  Skin: No rashes or lesions  Psychiatric: No anxiety or depressed mood    Physical Exam:   Visit Vitals  BP 92/63 (BP 1 Location: Right upper arm, BP Patient Position: Sitting, BP Cuff Size: Adult)   Pulse (!) 105   Temp 98.1 °F (36.7 °C) (Oral)   Resp 16   Ht 5' 7\" (1.702 m)   Wt 132 lb (59.9 kg)   SpO2 97%   BMI 20.67 kg/m²     General: No acute distress, conversant  Eyes: PERRLA, no scleral icterus  HENT: Normocephalic without oral lesions  Neck: Trachea midline without LAD  Cardiac: Normal pulse rate and rhythm  Pulmonary: Symmetric chest rise with normal effort  GI: Soft, thin, NT, ND, no hernia, no splenomegaly  Skin: Warm without rash  Extremities: No edema or joint stiffness  Psych: Appropriate mood and affect    Assessment:     62-year-old female with concerns for biliary dyskinesia    Plan:     CT scan and ultrasound reviewed. No signs of gallstones. Labs relatively normal besides possibly Gilbert's disease. I think the next best step would be a HIDA scan. I will order this ASAP. She appears to have classic biliary symptoms. We discussed if the HIDA scan is positive performing a laparoscopic cholecystectomy. We discussed the risk benefits surgery include bleeding, infection, bowel injury, biliary tree injury, liver injury, intestinal injury, retained stone, and the risk of anesthesia. The patient expressed understanding. I will call her with results of the HIDA scan and plan surgery if indicated from there.     Signed By: Batsheva James MD  Bariatric and General Surgeon  [facility]    September 19, 2022

## 2022-09-19 NOTE — PROGRESS NOTES
1. Have you been to the ER, urgent care clinic since your last visit? Hospitalized since your last visit? No    2. Have you seen or consulted any other health care providers outside of the [address] [place] [address] since your last visit? Include any pap smears or colon screening.  No

## 2022-09-23 ENCOUNTER — TELEPHONE (OUTPATIENT)
Dept: SURGERY | Age: 37
End: 2022-09-23

## 2022-09-23 ENCOUNTER — HOSPITAL ENCOUNTER (OUTPATIENT)
Dept: NUCLEAR MEDICINE | Age: 37
Discharge: HOME | End: 2022-09-23
Attending: SURGERY
Payer: COMMERCIAL

## 2022-09-23 DIAGNOSIS — K82.8 BILIARY DYSKINESIA: ICD-10-CM

## 2022-09-23 PROCEDURE — 78226 HEPATOBILIARY SYSTEM IMAGING: CPT

## 2022-09-23 RX ORDER — KIT FOR THE PREPARATION OF TECHNETIUM TC 99M MEBROFENIN 45 MG/10ML
5.2 INJECTION, POWDER, LYOPHILIZED, FOR SOLUTION INTRAVENOUS
Status: COMPLETED | OUTPATIENT
Start: 2022-09-23 | End: 2022-09-23

## 2022-09-23 RX ADMIN — KIT FOR THE PREPARATION OF TECHNETIUM TC 99M MEBROFENIN 5.2 MILLICURIE: 45 INJECTION, POWDER, LYOPHILIZED, FOR SOLUTION INTRAVENOUS at 07:00

## 2022-09-23 NOTE — TELEPHONE ENCOUNTER
Called pt. HIDA scan normal function. Ultrasound and CT also did not have findings to indicate etiology for her pain. She continues to lose weight. Recommend she goes back to see Santana Freitas for further work-up. Unfortunate situation she is in right now.

## 2022-10-11 ENCOUNTER — TRANSCRIBE ORDER (OUTPATIENT)
Dept: SCHEDULING | Age: 37
End: 2022-10-11

## 2022-10-11 DIAGNOSIS — R63.4 UNINTENTIONAL WEIGHT LOSS: ICD-10-CM

## 2022-10-11 DIAGNOSIS — R10.13 POSTPRANDIAL EPIGASTRIC PAIN: Primary | ICD-10-CM

## 2022-10-12 ENCOUNTER — TRANSCRIBE ORDER (OUTPATIENT)
Dept: SCHEDULING | Age: 37
End: 2022-10-12

## 2022-10-12 DIAGNOSIS — R10.13 POSTPRANDIAL EPIGASTRIC PAIN: Primary | ICD-10-CM

## 2022-10-12 DIAGNOSIS — R63.4 UNINTENTIONAL WEIGHT LOSS: ICD-10-CM

## 2022-10-18 ENCOUNTER — HOSPITAL ENCOUNTER (OUTPATIENT)
Dept: GENERAL RADIOLOGY | Age: 37
Discharge: HOME | End: 2022-10-18
Attending: INTERNAL MEDICINE
Payer: COMMERCIAL

## 2022-10-18 DIAGNOSIS — R10.13 POSTPRANDIAL EPIGASTRIC PAIN: ICD-10-CM

## 2022-10-18 DIAGNOSIS — R63.4 UNINTENTIONAL WEIGHT LOSS: ICD-10-CM

## 2022-10-18 PROCEDURE — 74246 X-RAY XM UPR GI TRC 2CNTRST: CPT

## 2022-10-21 ENCOUNTER — TRANSCRIBE ORDER (OUTPATIENT)
Dept: SCHEDULING | Age: 37
End: 2022-10-21

## 2022-10-21 DIAGNOSIS — R10.13 POSTPRANDIAL EPIGASTRIC PAIN: Primary | ICD-10-CM

## 2022-10-24 ENCOUNTER — TRANSCRIBE ORDER (OUTPATIENT)
Dept: SCHEDULING | Age: 37
End: 2022-10-24

## 2022-10-26 ENCOUNTER — HOSPITAL ENCOUNTER (OUTPATIENT)
Dept: CT IMAGING | Age: 37
Discharge: HOME | End: 2022-10-26
Attending: INTERNAL MEDICINE
Payer: COMMERCIAL

## 2022-10-26 DIAGNOSIS — R10.13 POSTPRANDIAL EPIGASTRIC PAIN: ICD-10-CM

## 2022-10-26 PROCEDURE — 74011000636 HC RX REV CODE- 636: Performed by: INTERNAL MEDICINE

## 2022-10-26 PROCEDURE — 74175 CTA ABDOMEN W/CONTRAST: CPT

## 2022-10-26 RX ADMIN — IOPAMIDOL 100 ML: 755 INJECTION, SOLUTION INTRAVENOUS at 18:17

## 2022-11-08 ENCOUNTER — TRANSCRIBE ORDER (OUTPATIENT)
Dept: SCHEDULING | Age: 37
End: 2022-11-08

## 2022-11-08 DIAGNOSIS — R10.13 POSTPRANDIAL EPIGASTRIC PAIN: Primary | ICD-10-CM

## 2023-03-27 ENCOUNTER — LAB ONLY (OUTPATIENT)
Dept: FAMILY MEDICINE CLINIC | Age: 38
End: 2023-03-27

## 2023-03-27 ENCOUNTER — OFFICE VISIT (OUTPATIENT)
Dept: FAMILY MEDICINE CLINIC | Age: 38
End: 2023-03-27
Payer: COMMERCIAL

## 2023-03-27 VITALS
TEMPERATURE: 98.5 F | BODY MASS INDEX: 20.97 KG/M2 | RESPIRATION RATE: 16 BRPM | HEIGHT: 67 IN | SYSTOLIC BLOOD PRESSURE: 92 MMHG | HEART RATE: 72 BPM | WEIGHT: 133.6 LBS | OXYGEN SATURATION: 98 % | DIASTOLIC BLOOD PRESSURE: 60 MMHG

## 2023-03-27 DIAGNOSIS — I77.4 MEDIAN ARCUATE LIGAMENT SYNDROME (HCC): ICD-10-CM

## 2023-03-27 DIAGNOSIS — T78.2XXD ANAPHYLAXIS, SUBSEQUENT ENCOUNTER: ICD-10-CM

## 2023-03-27 DIAGNOSIS — I77.4 MEDIAN ARCUATE LIGAMENT SYNDROME (HCC): Primary | ICD-10-CM

## 2023-03-27 PROCEDURE — 99203 OFFICE O/P NEW LOW 30 MIN: CPT | Performed by: FAMILY MEDICINE

## 2023-03-27 RX ORDER — TRETINOIN 0.25 MG/G
CREAM TOPICAL
COMMUNITY

## 2023-03-27 RX ORDER — ONDANSETRON 4 MG/1
4 TABLET, ORALLY DISINTEGRATING ORAL
Qty: 12 TABLET | Refills: 3 | Status: SHIPPED | OUTPATIENT
Start: 2023-03-27

## 2023-03-27 RX ORDER — EPINEPHRINE 0.3 MG/.3ML
INJECTION SUBCUTANEOUS
Qty: 1 EACH | Refills: 3 | Status: SHIPPED | OUTPATIENT
Start: 2023-03-27

## 2023-03-27 RX ORDER — TRAZODONE HYDROCHLORIDE 100 MG/1
100 TABLET ORAL DAILY
COMMUNITY
Start: 2023-03-17

## 2023-03-27 RX ORDER — EPINEPHRINE 0.3 MG/.3ML
INJECTION SUBCUTANEOUS
COMMUNITY
End: 2023-03-27 | Stop reason: SDUPTHER

## 2023-03-27 NOTE — PROGRESS NOTES
Identified pt with two pt identifiers(name and ). Chief Complaint   Patient presents with    Pre-op Exam          Health Maintenance Due   Topic    COVID-19 Vaccine (1)    Varicella Vaccine (1 of 2 - 2-dose childhood series)    Cervical cancer screen     Flu Vaccine (1)       Wt Readings from Last 3 Encounters:   23 133 lb 9.6 oz (60.6 kg)   22 132 lb (59.9 kg)   22 135 lb 12.8 oz (61.6 kg)     Temp Readings from Last 3 Encounters:   23 98.5 °F (36.9 °C) (Temporal)   22 98.1 °F (36.7 °C) (Oral)   22 97.7 °F (36.5 °C)     BP Readings from Last 3 Encounters:   23 92/60   22 92/63   22 117/86     Pulse Readings from Last 3 Encounters:   23 72   22 (!) 105   22 83         Learning Assessment:  :     Learning Assessment 5/3/2019 2018 2018   PRIMARY LEARNER Patient Patient Patient   HIGHEST LEVEL OF EDUCATION - PRIMARY LEARNER  - SOME COLLEGE -   BARRIERS PRIMARY LEARNER NONE NONE NONE   CO-LEARNER CAREGIVER No - No   PRIMARY LANGUAGE ENGLISH ENGLISH ENGLISH   LEARNER PREFERENCE PRIMARY LISTENING VIDEOS DEMONSTRATION     - - LISTENING   ANSWERED BY patient patient patient   RELATIONSHIP SELF SELF SELF       Depression Screening:  :     3 most recent PHQ Screens 3/27/2023   Little interest or pleasure in doing things Not at all   Feeling down, depressed, irritable, or hopeless Not at all   Total Score PHQ 2 0       Fall Risk Assessment:  :     No flowsheet data found. Abuse Screening:  :     Abuse Screening Questionnaire 3/27/2023 5/3/2019 2018   Do you ever feel afraid of your partner? N N N   Are you in a relationship with someone who physically or mentally threatens you? N N N   Is it safe for you to go home?  Y Y Y       Coordination of Care Questionnaire:  :     1) Have you been to an emergency room, urgent care clinic since your last visit? no   Hospitalized since your last visit? no             2) Have you seen or consulted any other health care providers outside of [address] [place] [address] since your last visit? yes  (Include any pap smears or colon screenings in this section.)    3) Do you have an Advance Directive on file? no  Are you interested in receiving information about Advance Directives? yes    Patient is accompanied by son I have received verbal consent from [address] [place] [address] to discuss any/all medical information while they are present in the room. 4.  For patients aged 39-70: Has the patient had a colonoscopy / FIT/ Cologuard? NA - based on age      If the patient is female:    11. For patients aged 41-77: Has the patient had a mammogram within the past 2 years? NA - based on age or sex      10. For patients aged 21-65: Has the patient had a pap smear?  Yes - no [facility] [place] present had at [facility] pt will get it faxed over

## 2023-03-28 LAB
ALBUMIN SERPL-MCNC: 4.1 G/DL (ref 3.5–5)
ALBUMIN/GLOB SERPL: 1.4 (ref 1.1–2.2)
ALP SERPL-CCNC: 70 U/L (ref 45–117)
ALT SERPL-CCNC: 23 U/L (ref 12–78)
ANION GAP SERPL CALC-SCNC: 1 MMOL/L (ref 5–15)
AST SERPL-CCNC: 17 U/L (ref 15–37)
BASOPHILS # BLD: 0.1 K/UL (ref 0–0.1)
BASOPHILS NFR BLD: 1 % (ref 0–1)
BILIRUB SERPL-MCNC: 1.3 MG/DL (ref 0.2–1)
BUN SERPL-MCNC: 12 MG/DL (ref 6–20)
BUN/CREAT SERPL: 12 (ref 12–20)
CALCIUM SERPL-MCNC: 8.8 MG/DL (ref 8.5–10.1)
CHLORIDE SERPL-SCNC: 105 MMOL/L (ref 97–108)
CO2 SERPL-SCNC: 29 MMOL/L (ref 21–32)
CREAT SERPL-MCNC: 1.03 MG/DL (ref 0.55–1.02)
DIFFERENTIAL METHOD BLD: NORMAL
EOSINOPHIL # BLD: 0.1 K/UL (ref 0–0.4)
EOSINOPHIL NFR BLD: 1 % (ref 0–7)
ERYTHROCYTE [DISTWIDTH] IN BLOOD BY AUTOMATED COUNT: 12.2 % (ref 11.5–14.5)
GLOBULIN SER CALC-MCNC: 3 G/DL (ref 2–4)
GLUCOSE SERPL-MCNC: 93 MG/DL (ref 65–100)
HCT VFR BLD AUTO: 45 % (ref 35–47)
HGB BLD-MCNC: 14.6 G/DL (ref 11.5–16)
IMM GRANULOCYTES # BLD AUTO: 0 K/UL (ref 0–0.04)
IMM GRANULOCYTES NFR BLD AUTO: 0 % (ref 0–0.5)
LYMPHOCYTES # BLD: 1.7 K/UL (ref 0.8–3.5)
LYMPHOCYTES NFR BLD: 22 % (ref 12–49)
MCH RBC QN AUTO: 31.5 PG (ref 26–34)
MCHC RBC AUTO-ENTMCNC: 32.4 G/DL (ref 30–36.5)
MCV RBC AUTO: 97 FL (ref 80–99)
MONOCYTES # BLD: 0.5 K/UL (ref 0–1)
MONOCYTES NFR BLD: 6 % (ref 5–13)
NEUTS SEG # BLD: 5.5 K/UL (ref 1.8–8)
NEUTS SEG NFR BLD: 70 % (ref 32–75)
NRBC # BLD: 0 K/UL (ref 0–0.01)
NRBC BLD-RTO: 0 PER 100 WBC
PLATELET # BLD AUTO: 294 K/UL (ref 150–400)
PMV BLD AUTO: 10.3 FL (ref 8.9–12.9)
POTASSIUM SERPL-SCNC: 5 MMOL/L (ref 3.5–5.1)
PROT SERPL-MCNC: 7.1 G/DL (ref 6.4–8.2)
RBC # BLD AUTO: 4.64 M/UL (ref 3.8–5.2)
SODIUM SERPL-SCNC: 135 MMOL/L (ref 136–145)
WBC # BLD AUTO: 7.9 K/UL (ref 3.6–11)

## 2023-03-28 NOTE — PROGRESS NOTES
Labs indicate that patient is retaining a little fluid, would advise patient to restrict sodium to below 2.5 mg daily, additionally, would advise the patient to limit total fluid intake to about 3 liters daily for a few days.

## 2023-03-28 NOTE — PROGRESS NOTES
Preoperative Evaluation    Date of Exam: 3/27/2023    Holly Valle is a 44 y.o. female (:1985) who presents for preoperative evaluation. Latex Allergy: no    Problem List:     Patient Active Problem List    Diagnosis Date Noted    Eosinophilic esophagitis 4677    NAFL (nonalcoholic fatty liver)     Neuropathy 2018     Medical History:     Past Medical History:   Diagnosis Date    Eosinophilic esophagitis     HX OTHER MEDICAL     neuropathy of right leg    NAFL (nonalcoholic fatty liver)      Allergies: Allergies   Allergen Reactions    Morphine Itching and Rash      Medications:     Current Outpatient Medications   Medication Sig    tretinoin (RETIN-A) 0.025 % topical cream tretinoin 0.025 % topical cream    traZODone (DESYREL) 100 mg tablet Take 100 mg by mouth daily. ondansetron (ZOFRAN ODT) 4 mg disintegrating tablet Take 1 Tablet by mouth every eight (8) hours as needed for Nausea for up to 12 doses. EPINEPHrine (EPIPEN) 0.3 mg/0.3 mL injection EpiPen 2-Hola 0.3 mg/0.3 mL injection, auto-injector   Take one injection intramuscular for anaphylaxis    MULTIVITAMIN PO Take  by mouth. No current facility-administered medications for this visit.      Surgical History:     Past Surgical History:   Procedure Laterality Date    HX ENDOSCOPY      Eosinophilic espohogitis    HX OTHER SURGICAL      dnc, fibroid in uterus removed    HX OTHER SURGICAL      tubes in ears     Social History:     Social History     Socioeconomic History    Marital status:    Tobacco Use    Smoking status: Never    Smokeless tobacco: Never   Vaping Use    Vaping Use: Never used   Substance and Sexual Activity    Alcohol use: No    Drug use: No    Sexual activity: Yes     Partners: Male     Birth control/protection: None     Social Determinants of Health     Financial Resource Strain: Low Risk     Difficulty of Paying Living Expenses: Not hard at all   Food Insecurity: No Food Insecurity    Worried About Running Out of Food in the Last Year: Never true    Ran Out of Food in the Last Year: Never true       Anesthesia Complications: None  History of abnormal bleeding : None  History of Blood Transfusions: no  Health Care Directive or Living Will: no    Objective:     ROS:   Feeling well. No dyspnea or chest pain on exertion. No abdominal pain, change in bowel habits, black or bloody stools. No urinary tract symptoms. GYN ROS: no vaginal bleeding, no discharge or pelvic pain. No neurological complaints. OBJECTIVE:   The patient appears well, alert, oriented x 3, in no distress. Visit Vitals  BP 92/60 (BP 1 Location: Right upper arm, BP Patient Position: Sitting, BP Cuff Size: Adult)   Pulse 72   Temp 98.5 °F (36.9 °C) (Temporal)   Resp 16   Ht 5' 7\" (1.702 m)   Wt 133 lb 9.6 oz (60.6 kg)   SpO2 98%   BMI 20.92 kg/m²     HEENT:ENT normal.  Neck supple. No adenopathy or thyromegaly. NIKA. Chest: Lungs are clear, good air entry, no wheezes, rhonchi or rales. Cardiovascular: S1 and S2 normal, no murmurs, regular rate and rhythm. Abdomen: soft without tenderness, guarding, mass or organomegaly. Extremities: show no edema, normal peripheral pulses. Neurological: is normal, no focal findings. BREAST EXAM: not examined    PELVIC EXAM: examination not indicated      DIAGNOSTICS:   1. EKG: EKG FINDINGS - patient had EKG which was already done and confirmed  2. CXR: completed and reviewed elswhere  3.  Labs:   Lab Results   Component Value Date/Time    WBC 7.9 03/27/2023 10:15 AM    HGB 14.6 03/27/2023 10:15 AM    HCT 45.0 03/27/2023 10:15 AM    PLATELET 938 17/22/8225 10:15 AM    MCV 97.0 03/27/2023 10:15 AM    Hgb, External 12.6 08/09/2013 12:00 AM    Hct, External 373.4 08/09/2013 12:00 AM    Hct, External 37.4 08/09/2013 12:00 AM    Platelet cnt., External 312 05/15/2013 12:00 AM     Lab Results   Component Value Date/Time    Glucose 93 03/27/2023 10:15 AM    Creatinine 1.03 (H) 03/27/2023 10:15 AM      No results found for: CHOL, CHOLPOCT, HDL, LDL, LDLC, LDLCPOC, LDLCEXT, TRIGL, TGLPOCT, CHHD, CHHDX  Lab Results   Component Value Date/Time    ALT (SGPT) 23 03/27/2023 10:15 AM    Alk. phosphatase 70 03/27/2023 10:15 AM    Bilirubin, direct 0.3 (H) 09/12/2022 03:43 PM    Bilirubin, total 1.3 (H) 03/27/2023 10:15 AM    Albumin 4.1 03/27/2023 10:15 AM    Protein, total 7.1 03/27/2023 10:15 AM    PLATELET 474 17/11/7193 10:15 AM    Platelet cnt., External 312 05/15/2013 12:00 AM       IMPRESSION:   None  Low risk for planned surgery  No contraindications to planned surgery    The ASCVD Risk score (Marth[place] DK, et al., 2019) failed to calculate for the following reasons: The 2019 ASCVD risk score is only valid for ages 36 to 78      Patients labs indicate that she has elevated bilirubin, and elevated creatinine. This should not affect cardiovascular system, but should be monitored during and after surgery, due to anesthesia and hepatic metabolism.      Melissa Webster MD   3/27/2023

## 2023-04-21 DIAGNOSIS — R10.13 POSTPRANDIAL EPIGASTRIC PAIN: Primary | ICD-10-CM

## 2023-04-21 DIAGNOSIS — R63.4 UNINTENTIONAL WEIGHT LOSS: ICD-10-CM

## 2023-04-22 DIAGNOSIS — R10.13 POSTPRANDIAL EPIGASTRIC PAIN: Primary | ICD-10-CM

## 2023-06-05 ENCOUNTER — TELEPHONE (OUTPATIENT)
Age: 38
End: 2023-06-05

## 2023-06-05 NOTE — TELEPHONE ENCOUNTER
Pt went to the ER yesterday and was prescribed a GI infection so they prescribed her a medication but she can't pick that up until she has a PA done. The ER is saying they don't do PA's and that we need to do it.     The [facility] in [facility] [address]  Phone: [phone]    Medicine is Rifaximin 550mg, 14 day supply 42 pills    Pt would like a call back when this is done

## 2023-06-07 ENCOUNTER — OFFICE VISIT (OUTPATIENT)
Age: 38
End: 2023-06-07
Payer: COMMERCIAL

## 2023-06-07 ENCOUNTER — TELEPHONE (OUTPATIENT)
Age: 38
End: 2023-06-07

## 2023-06-07 VITALS
TEMPERATURE: 97.8 F | WEIGHT: 123 LBS | RESPIRATION RATE: 16 BRPM | HEART RATE: 51 BPM | SYSTOLIC BLOOD PRESSURE: 110 MMHG | OXYGEN SATURATION: 98 % | HEIGHT: 67 IN | BODY MASS INDEX: 19.3 KG/M2 | DIASTOLIC BLOOD PRESSURE: 70 MMHG

## 2023-06-07 DIAGNOSIS — K63.89 SMALL INTESTINAL BACTERIAL OVERGROWTH: Primary | ICD-10-CM

## 2023-06-07 DIAGNOSIS — I77.4 MEDIAN ARCUATE LIGAMENT SYNDROME (HCC): ICD-10-CM

## 2023-06-07 PROCEDURE — 99213 OFFICE O/P EST LOW 20 MIN: CPT | Performed by: NURSE PRACTITIONER

## 2023-06-07 RX ORDER — BACLOFEN 10 MG/1
10 TABLET ORAL 3 TIMES DAILY
COMMUNITY

## 2023-06-07 RX ORDER — DIAZEPAM 5 MG/1
5 TABLET ORAL EVERY 6 HOURS PRN
COMMUNITY

## 2023-06-07 RX ORDER — HYDROMORPHONE HYDROCHLORIDE 2 MG/1
2 TABLET ORAL
COMMUNITY

## 2023-06-07 SDOH — ECONOMIC STABILITY: FOOD INSECURITY: WITHIN THE PAST 12 MONTHS, THE FOOD YOU BOUGHT JUST DIDN'T LAST AND YOU DIDN'T HAVE MONEY TO GET MORE.: NEVER TRUE

## 2023-06-07 SDOH — ECONOMIC STABILITY: INCOME INSECURITY: HOW HARD IS IT FOR YOU TO PAY FOR THE VERY BASICS LIKE FOOD, HOUSING, MEDICAL CARE, AND HEATING?: NOT HARD AT ALL

## 2023-06-07 SDOH — ECONOMIC STABILITY: HOUSING INSECURITY
IN THE LAST 12 MONTHS, WAS THERE A TIME WHEN YOU DID NOT HAVE A STEADY PLACE TO SLEEP OR SLEPT IN A SHELTER (INCLUDING NOW)?: NO

## 2023-06-07 SDOH — ECONOMIC STABILITY: FOOD INSECURITY: WITHIN THE PAST 12 MONTHS, YOU WORRIED THAT YOUR FOOD WOULD RUN OUT BEFORE YOU GOT MONEY TO BUY MORE.: NEVER TRUE

## 2023-06-07 ASSESSMENT — PATIENT HEALTH QUESTIONNAIRE - PHQ9
SUM OF ALL RESPONSES TO PHQ QUESTIONS 1-9: 0
1. LITTLE INTEREST OR PLEASURE IN DOING THINGS: 0
SUM OF ALL RESPONSES TO PHQ QUESTIONS 1-9: 0
2. FEELING DOWN, DEPRESSED OR HOPELESS: 0
SUM OF ALL RESPONSES TO PHQ QUESTIONS 1-9: 0
SUM OF ALL RESPONSES TO PHQ QUESTIONS 1-9: 0
SUM OF ALL RESPONSES TO PHQ9 QUESTIONS 1 & 2: 0

## 2023-06-07 ASSESSMENT — ENCOUNTER SYMPTOMS: DIARRHEA: 1

## 2023-06-07 NOTE — PROGRESS NOTES
Subjective:      Patient ID: Neva Chilel is a 44 y.o. female. HPI  Pt presents with \"ER follow up\"    Pt went to [facility] in [facility]  on 6/4 for diarrhea  She was having constant diarrhea after eating. She has hx of intestinal surgery on 4/5 by Dr Belia Landis in [place] for median arcuate ligament syndrome. This was repaired, and removed the nerves in the area that were inflamed and damaged. The ER doctor called her surgeon, and he stated that she was having dumping syndrome from small intestinal bacteria overgrowth, and needed Rifaximin. This was prescribed by the ER, but she needed PA. The ER refused to do the PA, so she is here for this to completed. She is not having abdominal pain, but is having diarrhea after eating anything. She has follow up with her surgeon on 7/5, in [place]. Review of Systems   Constitutional:  Negative for fatigue. Gastrointestinal:  Positive for diarrhea. Objective:   Physical Exam  Constitutional:       Appearance: Normal appearance. Cardiovascular:      Rate and Rhythm: Normal rate and regular rhythm. Heart sounds: Normal heart sounds. Pulmonary:      Effort: Pulmonary effort is normal.      Breath sounds: Normal breath sounds. Neurological:      Mental Status: She is alert. Psychiatric:         Mood and Affect: Mood normal.         Behavior: Behavior normal.       Assessment / Plan:       Diagnosis Orders   1. Small intestinal bacterial overgrowth        2. Median arcuate ligament syndrome ([facility] [address]          Educated about getting PA sent here for completion    Pt informed to return to office with worsening of symptoms, or PRN with any questions or concerns. Pt verbalizes understanding of plan of care and denies further questions or concerns at this time.

## 2023-06-07 NOTE — TELEPHONE ENCOUNTER
The Hematology office called because the entire office is booked up until September  so it needs to be for entirely new office.      [phone]

## 2023-06-07 NOTE — PROGRESS NOTES
Identified pt with two pt identifiers(name and ). Chief Complaint   Patient presents with    Diarrhea     After eating went to the ER at [facility] clinic pt states she needs a PA for [email or web address]         Health Maintenance Due   Topic    COVID-19 Vaccine (1)    Varicella vaccine (1 of 2 - 2-dose childhood series)    Hepatitis C screen     Cervical cancer screen        Wt Readings from Last 3 Encounters:   23 133 lb 9.6 oz (60.6 kg)   22 132 lb (59.9 kg)   22 135 lb 12.8 oz (61.6 kg)     Temp Readings from Last 3 Encounters:   No data found for Temp     BP Readings from Last 3 Encounters:   23 92/60   22 92/63   22 117/86     Pulse Readings from Last 3 Encounters:   23 72   22 (!) 105   22 83           Depression Screening:  :     PHQ-9 Questionaire 2023 3/27/2023 2022 2022   Little interest or pleasure in doing things 0 0 0 0   Feeling down, depressed, or hopeless 0 0 0 0   PHQ-9 Total Score 0 0 0 0        Fall Risk Assessment:  :   No flowsheet data found. Abuse Screening:  :   No flowsheet data found. Coordination of Care Questionnaire:  :     1. \"Have you been to the ER, urgent care clinic since your last visit? Hospitalized since your last visit? \" [facility] ER    2. \"Have you seen or consulted any other health care providers outside of the [address] [place] [address] since your last visit? \" no     3. This patient is accompanied in the office by her son. 4. For patients aged 39-70: Has the patient had a colonoscopy / FIT/ Cologuard? NA - based on age      If the patient is female:    11. For patients aged 41-77: Has the patient had a mammogram within the past 2 years? NA - based on age or sex      10. For patients aged 21-65: Has the patient had a pap smear?  No

## 2023-06-09 ENCOUNTER — TELEPHONE (OUTPATIENT)
Age: 38
End: 2023-06-09

## 2023-06-09 NOTE — TELEPHONE ENCOUNTER
----- Message from Dante Pill sent at 6/9/2023  2:41 PM EDT -----  Subject: Medication Problem    Medication: rifAXIMin (XIFAXAN) 550 MG tablet  Dosage: 3 times a day   Ordering Provider: Lv Brunner    Question/Problem: Patient needs a prior authorization sent to the   Insurance Co. before she can  this medication.        Pharmacy: [address] [place] [address] [phone]    ---------------------------------------------------------------------------  --------------  Lashonda BATES  [phone]; OK to leave message on voicemail  ---------------------------------------------------------------------------  --------------    SCRIPT ANSWERS  Relationship to Patient: Self

## 2023-08-17 ENCOUNTER — TELEPHONE (OUTPATIENT)
Age: 38
End: 2023-08-17

## 2023-08-17 NOTE — TELEPHONE ENCOUNTER
Pt just found out her new job has black mold and she's been having what she thinks is allergy issues since working there. Pt would like to know if theres a blood test she can take to know if her issues are from the black mold.

## 2023-08-22 ENCOUNTER — OFFICE VISIT (OUTPATIENT)
Age: 38
End: 2023-08-22
Payer: COMMERCIAL

## 2023-08-22 VITALS
OXYGEN SATURATION: 98 % | RESPIRATION RATE: 18 BRPM | WEIGHT: 131 LBS | DIASTOLIC BLOOD PRESSURE: 70 MMHG | BODY MASS INDEX: 20.56 KG/M2 | HEART RATE: 80 BPM | HEIGHT: 67 IN | TEMPERATURE: 97.8 F | SYSTOLIC BLOOD PRESSURE: 112 MMHG

## 2023-08-22 DIAGNOSIS — G47.00 INSOMNIA, UNSPECIFIED TYPE: ICD-10-CM

## 2023-08-22 DIAGNOSIS — Z91.018 MULTIPLE FOOD ALLERGIES: Primary | ICD-10-CM

## 2023-08-22 PROCEDURE — 99213 OFFICE O/P EST LOW 20 MIN: CPT | Performed by: NURSE PRACTITIONER

## 2023-08-22 RX ORDER — EPINEPHRINE 0.3 MG/.3ML
0.3 INJECTION SUBCUTANEOUS ONCE
Qty: 0.3 ML | Refills: 2 | Status: SHIPPED | OUTPATIENT
Start: 2023-08-22 | End: 2023-08-22

## 2023-08-22 RX ORDER — MEDROXYPROGESTERONE ACETATE 150 MG/ML
150 INJECTION, SUSPENSION INTRAMUSCULAR
COMMUNITY

## 2023-08-22 RX ORDER — HYDROXYZINE 50 MG/1
50 TABLET, FILM COATED ORAL NIGHTLY
Qty: 30 TABLET | Refills: 0 | Status: SHIPPED | OUTPATIENT
Start: 2023-08-22 | End: 2023-09-21

## 2023-08-22 ASSESSMENT — PATIENT HEALTH QUESTIONNAIRE - PHQ9
SUM OF ALL RESPONSES TO PHQ QUESTIONS 1-9: 0
1. LITTLE INTEREST OR PLEASURE IN DOING THINGS: 0
SUM OF ALL RESPONSES TO PHQ QUESTIONS 1-9: 0
SUM OF ALL RESPONSES TO PHQ QUESTIONS 1-9: 0
SUM OF ALL RESPONSES TO PHQ9 QUESTIONS 1 & 2: 0
2. FEELING DOWN, DEPRESSED OR HOPELESS: 0
SUM OF ALL RESPONSES TO PHQ QUESTIONS 1-9: 0

## 2023-08-22 NOTE — PROGRESS NOTES
Subjective:      Patient ID: Yanet Daily is a 43 y.o. female. HPI  Pt presents with \"exposure to mold and insomnia\"    Pt states that she has been told that there is black mold in her office. She states that she is now wondering if the black mold could be the cause of all of her health problems, that started in 2016. She notes esophagitis, and multiple food allergies. She has been seen by allergist, and was told that they are uncertain as to why she got so many allergies. No cough, no shortness of breath  CTA of chest done in October, was stable    In addition, she states that she has been on Trazodone for a long while for insomnia  She states that she is able to fall asleep, but still wakes at night, around 2 am  She is wondering if there is something else that she can take? Review of Systems   Constitutional:  Positive for fatigue. Objective:   Physical Exam  Constitutional:       Appearance: Normal appearance. Cardiovascular:      Rate and Rhythm: Normal rate and regular rhythm. Heart sounds: Normal heart sounds. Pulmonary:      Effort: Pulmonary effort is normal.      Breath sounds: Normal breath sounds. Neurological:      Mental Status: She is alert. Psychiatric:         Mood and Affect: Mood normal.         Behavior: Behavior normal.       Assessment / Plan:          Diagnosis Orders   1. Multiple food allergies  EPINEPHrine (EPIPEN 2-KWAME) 0.3 MG/0.3ML SOAJ injection      2. Insomnia, unspecified type  hydrOXYzine HCl (ATARAX) 50 MG tablet        Educated that she can try atarax for insomnia    Should call allergist about possible correlation with black mold and allergies, but I am not able to speak to this    Pt informed to return to office with worsening of symptoms, or PRN with any questions or concerns. Pt verbalizes understanding of plan of care and denies further questions or concerns at this time.

## 2023-09-15 DIAGNOSIS — G47.00 INSOMNIA, UNSPECIFIED TYPE: ICD-10-CM

## 2023-09-15 RX ORDER — HYDROXYZINE 50 MG/1
50 TABLET, FILM COATED ORAL NIGHTLY
Qty: 90 TABLET | Refills: 0 | Status: SHIPPED | OUTPATIENT
Start: 2023-09-15 | End: 2023-12-14

## 2023-09-15 NOTE — TELEPHONE ENCOUNTER
Pt requesting refill for hydrOXYzine HCl (ATARAX) 50 MG tablet sent to [facility]/PHARMACY #[id number] [facility] [place] - [address] [facility] [place] [facility] Erna [address] [phone] - F [phone]

## 2023-10-05 ENCOUNTER — TELEMEDICINE (OUTPATIENT)
Age: 38
End: 2023-10-05
Payer: COMMERCIAL

## 2023-10-05 ENCOUNTER — PATIENT MESSAGE (OUTPATIENT)
Age: 38
End: 2023-10-05

## 2023-10-05 DIAGNOSIS — F51.01 PRIMARY INSOMNIA: ICD-10-CM

## 2023-10-05 DIAGNOSIS — F51.01 PRIMARY INSOMNIA: Primary | ICD-10-CM

## 2023-10-05 PROCEDURE — 99213 OFFICE O/P EST LOW 20 MIN: CPT | Performed by: FAMILY MEDICINE

## 2023-10-05 RX ORDER — TRETINOIN 0.5 MG/G
CREAM TOPICAL
COMMUNITY
Start: 2023-09-07

## 2023-10-05 RX ORDER — ESZOPICLONE 1 MG/1
1 TABLET, FILM COATED ORAL NIGHTLY
Qty: 30 TABLET | Refills: 0 | Status: SHIPPED | OUTPATIENT
Start: 2023-11-05 | End: 2023-12-05

## 2023-10-05 RX ORDER — ESZOPICLONE 1 MG/1
1 TABLET, FILM COATED ORAL NIGHTLY
Qty: 30 TABLET | Refills: 0 | Status: SHIPPED | OUTPATIENT
Start: 2023-10-05 | End: 2023-10-05

## 2023-10-05 RX ORDER — ESZOPICLONE 1 MG/1
1 TABLET, FILM COATED ORAL NIGHTLY
Qty: 30 TABLET | Refills: 0 | Status: SHIPPED | OUTPATIENT
Start: 2023-10-05 | End: 2023-11-04

## 2023-10-05 RX ORDER — ESZOPICLONE 1 MG/1
1 TABLET, FILM COATED ORAL NIGHTLY
Qty: 30 TABLET | Refills: 0 | Status: SHIPPED | OUTPATIENT
Start: 2023-12-06 | End: 2024-01-05

## 2023-10-05 RX ORDER — ESZOPICLONE 1 MG/1
1 TABLET, FILM COATED ORAL NIGHTLY
Qty: 30 TABLET | Refills: 0 | Status: SHIPPED | OUTPATIENT
Start: 2023-12-06 | End: 2023-10-05

## 2023-10-05 RX ORDER — ESZOPICLONE 1 MG/1
1 TABLET, FILM COATED ORAL NIGHTLY
Qty: 30 TABLET | Refills: 0 | Status: SHIPPED | OUTPATIENT
Start: 2023-11-05 | End: 2023-10-05

## 2023-10-05 RX ORDER — SPIRONOLACTONE 25 MG/1
TABLET ORAL
COMMUNITY
Start: 2023-09-05

## 2023-10-08 ASSESSMENT — ENCOUNTER SYMPTOMS
SWOLLEN GLANDS: 0
VOMITING: 0
NAUSEA: 0
COUGH: 0
VISUAL CHANGE: 0
ABDOMINAL PAIN: 0
SORE THROAT: 0
CHANGE IN BOWEL HABIT: 0

## 2023-10-12 RX ORDER — ESZOPICLONE 1 MG/1
1 TABLET, FILM COATED ORAL NIGHTLY
Qty: 30 TABLET | Refills: 0 | Status: SHIPPED | OUTPATIENT
Start: 2023-12-13 | End: 2024-01-12

## 2023-10-12 RX ORDER — ESZOPICLONE 1 MG/1
1 TABLET, FILM COATED ORAL NIGHTLY
Qty: 30 TABLET | Refills: 0 | Status: SHIPPED | OUTPATIENT
Start: 2023-10-12 | End: 2023-11-11

## 2023-10-12 RX ORDER — ESZOPICLONE 1 MG/1
1 TABLET, FILM COATED ORAL NIGHTLY
Qty: 30 TABLET | Refills: 0 | Status: SHIPPED | OUTPATIENT
Start: 2023-11-12 | End: 2023-12-12

## 2024-01-29 ENCOUNTER — OFFICE VISIT (OUTPATIENT)
Age: 39
End: 2024-01-29

## 2024-01-29 ENCOUNTER — TELEPHONE (OUTPATIENT)
Age: 39
End: 2024-01-29

## 2024-01-29 VITALS
DIASTOLIC BLOOD PRESSURE: 89 MMHG | BODY MASS INDEX: 23.81 KG/M2 | OXYGEN SATURATION: 98 % | TEMPERATURE: 98.6 F | SYSTOLIC BLOOD PRESSURE: 125 MMHG | WEIGHT: 152 LBS | HEART RATE: 83 BPM

## 2024-01-29 DIAGNOSIS — Z20.828 EXPOSURE TO INFLUENZA: Primary | ICD-10-CM

## 2024-01-29 DIAGNOSIS — R68.89 FLU-LIKE SYMPTOMS: ICD-10-CM

## 2024-01-29 LAB
INFLUENZA A ANTIGEN, POC: NEGATIVE
INFLUENZA B ANTIGEN, POC: NEGATIVE
VALID INTERNAL CONTROL, POC: NORMAL

## 2024-01-29 RX ORDER — BENZONATATE 100 MG/1
100 CAPSULE ORAL 3 TIMES DAILY PRN
Qty: 30 CAPSULE | Refills: 0 | Status: SHIPPED | OUTPATIENT
Start: 2024-01-29 | End: 2024-02-08

## 2024-01-29 RX ORDER — METHYLPREDNISOLONE 4 MG/1
4 TABLET ORAL SEE ADMIN INSTRUCTIONS
Qty: 1 KIT | Refills: 0 | Status: SHIPPED | OUTPATIENT
Start: 2024-01-29

## 2024-01-29 RX ORDER — DEXTROMETHORPHAN HYDROBROMIDE AND PROMETHAZINE HYDROCHLORIDE 15; 6.25 MG/5ML; MG/5ML
5 SYRUP ORAL 4 TIMES DAILY PRN
Qty: 118 ML | Refills: 0 | Status: SHIPPED | OUTPATIENT
Start: 2024-01-29 | End: 2024-02-05

## 2024-01-29 RX ORDER — OSELTAMIVIR PHOSPHATE 75 MG/1
75 CAPSULE ORAL DAILY
Qty: 7 CAPSULE | Refills: 0 | Status: SHIPPED | OUTPATIENT
Start: 2024-01-29 | End: 2024-02-05

## 2024-01-29 ASSESSMENT — ENCOUNTER SYMPTOMS
SORE THROAT: 1
COUGH: 1
ALLERGIC/IMMUNOLOGIC NEGATIVE: 1
SINUS PRESSURE: 1
RHINORRHEA: 1
GASTROINTESTINAL NEGATIVE: 1
EYES NEGATIVE: 1

## 2024-01-29 NOTE — PROGRESS NOTES
2024   Mary Franks (: 1985) is a 38 y.o. female, New patient, here for evaluation of the following chief complaint(s):  Congestion (Sx started yesterday , fatigue , sore throat - daughter tested pos for flu A)     ASSESSMENT/PLAN:  Below is the assessment and plan developed based on review of pertinent history, physical exam, labs, studies, and medications.  1. Exposure to influenza  -     oseltamivir (TAMIFLU) 75 MG capsule; Take 1 capsule by mouth daily for 7 days, Disp-7 capsule, R-0Normal  -     methylPREDNISolone (MEDROL, KWAME,) 4 MG tablet; Take 1 tablet by mouth See Admin Instructions Take by mouth., Disp-1 kit, R-0Normal  -     promethazine-dextromethorphan (PROMETHAZINE-DM) 6.25-15 MG/5ML syrup; Take 5 mLs by mouth 4 times daily as needed for Cough, Disp-118 mL, R-0Normal  -     benzonatate (TESSALON) 100 MG capsule; Take 1 capsule by mouth 3 times daily as needed for Cough, Disp-30 capsule, R-0Normal  2. Flu-like symptoms  -     AMB POC INFLUENZA A  AND B REAL-TIME RT-PCR  -     oseltamivir (TAMIFLU) 75 MG capsule; Take 1 capsule by mouth daily for 7 days, Disp-7 capsule, R-0Normal         Handout given with care instructions  2. OTC for symptom management. Increase fluid intake, ensure adequate nutritional intake.  3. Follow up with PCP as needed.  4. Go to ED with development of any acute symptoms.     Follow up:  Return if symptoms worsen or fail to improve.  Follow up immediately for any new, worsening or changes or if symptoms are not improving over the next 5-7 days.     SUBJECTIVE/OBJECTIVE:  HPI     Diagnoses and all orders for this visit:  Exposure to influenza  Flu-like symptoms  Congestion (Sx started yesterday , fatigue , sore throat - daughter tested pos for flu A)  Mary Franks is a 38 y.o. female who complains of congestion, sore throat, nasal blockage, post nasal drip, productive cough, myalgias, bilateral sinus pain, and left ear pressure for 1 days. She denies a history of

## 2024-01-29 NOTE — TELEPHONE ENCOUNTER
Pt called and said that she has been feeling sick and her daughter tested positive for Flu A today and she is having the same symptoms as her. Pt was a [facility] pt and would like an appointment to be tested but nothing available. Can we fit her in?

## (undated) DEVICE — SYRINGE 50ML E/T

## (undated) DEVICE — BASIN EMSIS 16OZ GRAPHITE PLAS KID SHP MOLD GRAD FOR ORAL

## (undated) DEVICE — Device